# Patient Record
Sex: FEMALE | Race: WHITE | NOT HISPANIC OR LATINO | Employment: FULL TIME | URBAN - METROPOLITAN AREA
[De-identification: names, ages, dates, MRNs, and addresses within clinical notes are randomized per-mention and may not be internally consistent; named-entity substitution may affect disease eponyms.]

---

## 2023-09-28 LAB
ALANINE AMINOTRANSFERASE (SGPT) (U/L) IN SER/PLAS: 18 U/L (ref 7–45)
ALBUMIN (G/DL) IN SER/PLAS: 4.2 G/DL (ref 3.4–5)
ALKALINE PHOSPHATASE (U/L) IN SER/PLAS: 64 U/L (ref 33–136)
ANION GAP IN SER/PLAS: 16 MMOL/L (ref 10–20)
ASPARTATE AMINOTRANSFERASE (SGOT) (U/L) IN SER/PLAS: 17 U/L (ref 9–39)
BASOPHILS (10*3/UL) IN BLOOD BY AUTOMATED COUNT: 0.04 X10E9/L (ref 0–0.1)
BASOPHILS/100 LEUKOCYTES IN BLOOD BY AUTOMATED COUNT: 0.6 % (ref 0–2)
BILIRUBIN TOTAL (MG/DL) IN SER/PLAS: 0.5 MG/DL (ref 0–1.2)
CALCIUM (MG/DL) IN SER/PLAS: 9.9 MG/DL (ref 8.6–10.6)
CARBON DIOXIDE, TOTAL (MMOL/L) IN SER/PLAS: 24 MMOL/L (ref 21–32)
CHLORIDE (MMOL/L) IN SER/PLAS: 106 MMOL/L (ref 98–107)
CHOLESTEROL (MG/DL) IN SER/PLAS: 229 MG/DL (ref 0–199)
CHOLESTEROL IN HDL (MG/DL) IN SER/PLAS: 32 MG/DL
CHOLESTEROL/HDL RATIO: 7.2
COBALAMIN (VITAMIN B12) (PG/ML) IN SER/PLAS: 1322 PG/ML (ref 211–911)
CREATININE (MG/DL) IN SER/PLAS: 0.72 MG/DL (ref 0.5–1.05)
EOSINOPHILS (10*3/UL) IN BLOOD BY AUTOMATED COUNT: 0.36 X10E9/L (ref 0–0.7)
EOSINOPHILS/100 LEUKOCYTES IN BLOOD BY AUTOMATED COUNT: 5.1 % (ref 0–6)
ERYTHROCYTE DISTRIBUTION WIDTH (RATIO) BY AUTOMATED COUNT: 13 % (ref 11.5–14.5)
ERYTHROCYTE MEAN CORPUSCULAR HEMOGLOBIN CONCENTRATION (G/DL) BY AUTOMATED: 33.1 G/DL (ref 32–36)
ERYTHROCYTE MEAN CORPUSCULAR VOLUME (FL) BY AUTOMATED COUNT: 92 FL (ref 80–100)
ERYTHROCYTES (10*6/UL) IN BLOOD BY AUTOMATED COUNT: 4.75 X10E12/L (ref 4–5.2)
ESTIMATED AVERAGE GLUCOSE FOR HBA1C: 117 MG/DL
FOLATE (NG/ML) IN SER/PLAS: 23.8 NG/ML
GFR FEMALE: 90 ML/MIN/1.73M2
GLUCOSE (MG/DL) IN SER/PLAS: 116 MG/DL (ref 74–99)
HEMATOCRIT (%) IN BLOOD BY AUTOMATED COUNT: 43.5 % (ref 36–46)
HEMOGLOBIN (G/DL) IN BLOOD: 14.4 G/DL (ref 12–16)
HEMOGLOBIN A1C/HEMOGLOBIN TOTAL IN BLOOD: 5.7 %
IMMATURE GRANULOCYTES/100 LEUKOCYTES IN BLOOD BY AUTOMATED COUNT: 0.3 % (ref 0–0.9)
LDL: 135 MG/DL (ref 0–99)
LEUKOCYTES (10*3/UL) IN BLOOD BY AUTOMATED COUNT: 7.1 X10E9/L (ref 4.4–11.3)
LYMPHOCYTES (10*3/UL) IN BLOOD BY AUTOMATED COUNT: 1.61 X10E9/L (ref 1.2–4.8)
LYMPHOCYTES/100 LEUKOCYTES IN BLOOD BY AUTOMATED COUNT: 22.8 % (ref 13–44)
MONOCYTES (10*3/UL) IN BLOOD BY AUTOMATED COUNT: 0.44 X10E9/L (ref 0.1–1)
MONOCYTES/100 LEUKOCYTES IN BLOOD BY AUTOMATED COUNT: 6.2 % (ref 2–10)
NEUTROPHILS (10*3/UL) IN BLOOD BY AUTOMATED COUNT: 4.6 X10E9/L (ref 1.2–7.7)
NEUTROPHILS/100 LEUKOCYTES IN BLOOD BY AUTOMATED COUNT: 65 % (ref 40–80)
NON HDL CHOLESTEROL: 197 MG/DL
NRBC (PER 100 WBCS) BY AUTOMATED COUNT: 0 /100 WBC (ref 0–0)
PLATELETS (10*3/UL) IN BLOOD AUTOMATED COUNT: 222 X10E9/L (ref 150–450)
POTASSIUM (MMOL/L) IN SER/PLAS: 3.9 MMOL/L (ref 3.5–5.3)
PROTEIN TOTAL: 7 G/DL (ref 6.4–8.2)
SODIUM (MMOL/L) IN SER/PLAS: 142 MMOL/L (ref 136–145)
THYROTROPIN (MIU/L) IN SER/PLAS BY DETECTION LIMIT <= 0.05 MIU/L: 2.08 MIU/L (ref 0.44–3.98)
TRIGLYCERIDE (MG/DL) IN SER/PLAS: 309 MG/DL (ref 0–149)
UREA NITROGEN (MG/DL) IN SER/PLAS: 15 MG/DL (ref 6–23)
VLDL: 62 MG/DL (ref 0–40)

## 2023-10-02 LAB — METHYLMALONIC ACID, S: 0.19 UMOL/L (ref 0–0.4)

## 2023-11-01 PROBLEM — E78.2 MIXED HYPERLIPIDEMIA: Status: ACTIVE | Noted: 2023-11-01

## 2023-11-01 PROBLEM — R06.09 DYSPNEA ON EXERTION: Status: ACTIVE | Noted: 2023-11-01

## 2023-11-01 PROBLEM — R29.898 RIGHT HAND WEAKNESS: Status: ACTIVE | Noted: 2023-11-01

## 2023-11-01 PROBLEM — L82.1 SEBORRHEIC KERATOSES: Status: ACTIVE | Noted: 2022-11-17

## 2023-11-01 PROBLEM — L81.4 OTHER MELANIN HYPERPIGMENTATION: Status: ACTIVE | Noted: 2022-11-17

## 2023-11-01 PROBLEM — F45.8 BRUXISM: Status: ACTIVE | Noted: 2023-11-01

## 2023-11-01 PROBLEM — E66.2 CLASS 1 OBESITY WITH ALVEOLAR HYPOVENTILATION AND BODY MASS INDEX (BMI) OF 30.0 TO 30.9 IN ADULT (MULTI): Status: ACTIVE | Noted: 2023-11-01

## 2023-11-01 PROBLEM — L21.8 OTHER SEBORRHEIC DERMATITIS: Status: ACTIVE | Noted: 2022-11-17

## 2023-11-01 PROBLEM — R94.31 INVERTED T WAVE: Status: ACTIVE | Noted: 2023-11-01

## 2023-11-01 PROBLEM — G47.19 EXCESSIVE DAYTIME SLEEPINESS: Status: ACTIVE | Noted: 2023-11-01

## 2023-11-01 PROBLEM — R73.03 PREDIABETES: Status: ACTIVE | Noted: 2023-11-01

## 2023-11-01 PROBLEM — R42 ORTHOSTATIC DIZZINESS: Status: ACTIVE | Noted: 2023-11-01

## 2023-11-01 PROBLEM — J45.909 ASTHMA (HHS-HCC): Status: ACTIVE | Noted: 2023-11-01

## 2023-11-01 PROBLEM — N39.41 URGE INCONTINENCE: Status: ACTIVE | Noted: 2023-11-01

## 2023-11-01 PROBLEM — D18.01 HEMANGIOMA OF SKIN AND SUBCUTANEOUS TISSUE: Status: ACTIVE | Noted: 2022-11-17

## 2023-11-01 PROBLEM — R26.81 UNSTEADY GAIT: Status: ACTIVE | Noted: 2023-11-01

## 2023-11-01 PROBLEM — N32.81 OVERACTIVE BLADDER: Status: ACTIVE | Noted: 2023-11-01

## 2023-11-01 PROBLEM — L40.9 PSORIASIS: Status: ACTIVE | Noted: 2023-11-01

## 2023-11-01 PROBLEM — M41.9 SCOLIOSIS OF CERVICAL SPINE: Status: ACTIVE | Noted: 2023-11-01

## 2023-11-01 PROBLEM — E66.811 CLASS 1 OBESITY WITH ALVEOLAR HYPOVENTILATION AND BODY MASS INDEX (BMI) OF 30.0 TO 30.9 IN ADULT: Status: ACTIVE | Noted: 2023-11-01

## 2023-11-01 PROBLEM — R32 URINARY INCONTINENCE: Status: ACTIVE | Noted: 2023-11-01

## 2023-11-01 PROBLEM — Z78.0 MENOPAUSE: Status: ACTIVE | Noted: 2023-11-01

## 2023-11-01 PROBLEM — L57.8 OTHER SKIN CHANGES DUE TO CHRONIC EXPOSURE TO NONIONIZING RADIATION: Status: ACTIVE | Noted: 2022-11-17

## 2023-11-01 PROBLEM — D69.2 PURPURA (CMS-HCC): Status: ACTIVE | Noted: 2023-11-01

## 2023-11-01 PROBLEM — R41.3 MEMORY CHANGES: Status: ACTIVE | Noted: 2023-11-01

## 2023-11-01 PROBLEM — H53.9 VISION CHANGES: Status: ACTIVE | Noted: 2023-11-01

## 2023-11-01 PROBLEM — R20.2 PARESTHESIAS: Status: ACTIVE | Noted: 2023-11-01

## 2023-11-01 PROBLEM — G56.02 CARPAL TUNNEL SYNDROME OF LEFT WRIST: Status: ACTIVE | Noted: 2023-11-01

## 2023-11-01 PROBLEM — K59.09 CHRONIC CONSTIPATION: Status: ACTIVE | Noted: 2023-11-01

## 2023-11-01 PROBLEM — R51.9 CHRONIC DAILY HEADACHE: Status: ACTIVE | Noted: 2023-11-01

## 2023-11-01 RX ORDER — BIOTIN 10 MG
1 TABLET ORAL DAILY
COMMUNITY
Start: 2018-01-22

## 2023-11-01 RX ORDER — IBUPROFEN 200 MG
200 TABLET ORAL
COMMUNITY
Start: 2020-04-01

## 2023-11-01 RX ORDER — IBUPROFEN 100 MG/5ML
1 SUSPENSION, ORAL (FINAL DOSE FORM) ORAL DAILY
COMMUNITY
Start: 2016-08-23

## 2023-11-01 RX ORDER — CHOLECALCIFEROL (VITAMIN D3) 25 MCG
1 TABLET ORAL DAILY
COMMUNITY

## 2023-11-01 RX ORDER — PRAVASTATIN SODIUM 20 MG/1
1 TABLET ORAL NIGHTLY
COMMUNITY
Start: 2021-05-19 | End: 2023-11-27 | Stop reason: SDUPTHER

## 2023-11-01 RX ORDER — MULTIVITAMIN/IRON/FOLIC ACID 18MG-0.4MG
1 TABLET ORAL DAILY
COMMUNITY
Start: 2016-08-23

## 2023-11-01 RX ORDER — ACETAMINOPHEN 160 MG/5ML
1 SUSPENSION, ORAL (FINAL DOSE FORM) ORAL DAILY
COMMUNITY
Start: 2016-08-23

## 2023-11-01 RX ORDER — TRIAMCINOLONE ACETONIDE 1 MG/G
CREAM TOPICAL
COMMUNITY
Start: 2017-09-26 | End: 2023-11-27 | Stop reason: SDUPTHER

## 2023-11-01 RX ORDER — UBIDECARENONE/VIT E ACET 100MG-5
1 CAPSULE ORAL DAILY
COMMUNITY

## 2023-11-01 RX ORDER — SELENIUM SULFIDE 2.5 MG/100ML
LOTION TOPICAL
COMMUNITY
Start: 2017-09-26

## 2023-11-01 RX ORDER — MAGNESIUM 200 MG
TABLET ORAL
COMMUNITY

## 2023-11-01 RX ORDER — ALBUTEROL SULFATE 90 UG/1
2 AEROSOL, METERED RESPIRATORY (INHALATION) EVERY 4 HOURS PRN
COMMUNITY
Start: 2020-04-01 | End: 2023-11-27 | Stop reason: SDUPTHER

## 2023-11-01 RX ORDER — LANOLIN ALCOHOL/MO/W.PET/CERES
1 CREAM (GRAM) TOPICAL DAILY
COMMUNITY
Start: 2016-08-23

## 2023-11-02 ENCOUNTER — LAB (OUTPATIENT)
Dept: LAB | Facility: LAB | Age: 68
End: 2023-11-02
Payer: COMMERCIAL

## 2023-11-02 ENCOUNTER — OFFICE VISIT (OUTPATIENT)
Dept: PRIMARY CARE | Facility: CLINIC | Age: 68
End: 2023-11-02
Payer: COMMERCIAL

## 2023-11-02 ENCOUNTER — APPOINTMENT (OUTPATIENT)
Dept: LAB | Facility: LAB | Age: 68
End: 2023-11-02
Payer: COMMERCIAL

## 2023-11-02 VITALS
HEIGHT: 62 IN | OXYGEN SATURATION: 97 % | DIASTOLIC BLOOD PRESSURE: 92 MMHG | BODY MASS INDEX: 33.21 KG/M2 | WEIGHT: 180.5 LBS | SYSTOLIC BLOOD PRESSURE: 133 MMHG | TEMPERATURE: 97 F | HEART RATE: 66 BPM

## 2023-11-02 DIAGNOSIS — R09.89 CHRONIC THROAT CLEARING: Primary | ICD-10-CM

## 2023-11-02 DIAGNOSIS — H93.8X3 CONGESTION OF BOTH EARS: ICD-10-CM

## 2023-11-02 DIAGNOSIS — R51.9 CHRONIC DAILY HEADACHE: ICD-10-CM

## 2023-11-02 DIAGNOSIS — S93.402D SPRAIN OF LEFT ANKLE, UNSPECIFIED LIGAMENT, SUBSEQUENT ENCOUNTER: ICD-10-CM

## 2023-11-02 LAB
ANION GAP SERPL CALC-SCNC: 9 MMOL/L (ref 10–20)
BUN SERPL-MCNC: 16 MG/DL (ref 6–23)
CALCIUM SERPL-MCNC: 9.9 MG/DL (ref 8.6–10.6)
CHLORIDE SERPL-SCNC: 107 MMOL/L (ref 98–107)
CO2 SERPL-SCNC: 28 MMOL/L (ref 21–32)
CREAT SERPL-MCNC: 0.74 MG/DL (ref 0.5–1.05)
GFR SERPL CREATININE-BSD FRML MDRD: 88 ML/MIN/1.73M*2
GLUCOSE SERPL-MCNC: 119 MG/DL (ref 74–99)
POTASSIUM SERPL-SCNC: 4.2 MMOL/L (ref 3.5–5.3)
SODIUM SERPL-SCNC: 140 MMOL/L (ref 136–145)

## 2023-11-02 PROCEDURE — 80048 BASIC METABOLIC PNL TOTAL CA: CPT

## 2023-11-02 PROCEDURE — 1159F MED LIST DOCD IN RCRD: CPT | Performed by: INTERNAL MEDICINE

## 2023-11-02 PROCEDURE — 36415 COLL VENOUS BLD VENIPUNCTURE: CPT

## 2023-11-02 PROCEDURE — 99214 OFFICE O/P EST MOD 30 MIN: CPT | Performed by: INTERNAL MEDICINE

## 2023-11-02 PROCEDURE — 1160F RVW MEDS BY RX/DR IN RCRD: CPT | Performed by: INTERNAL MEDICINE

## 2023-11-02 PROCEDURE — 1036F TOBACCO NON-USER: CPT | Performed by: INTERNAL MEDICINE

## 2023-11-02 RX ORDER — OMEPRAZOLE 40 MG/1
40 CAPSULE, DELAYED RELEASE ORAL
Qty: 30 CAPSULE | Refills: 2 | Status: SHIPPED | OUTPATIENT
Start: 2023-11-02 | End: 2024-01-31

## 2023-11-02 ASSESSMENT — ENCOUNTER SYMPTOMS
LIGHT-HEADEDNESS: 0
HEMATURIA: 0
CONSTIPATION: 0
PALPITATIONS: 0
ABDOMINAL PAIN: 0
ARTHRALGIAS: 1
VOMITING: 0
MYALGIAS: 1
DIARRHEA: 0
DYSURIA: 0
COUGH: 1
FATIGUE: 0
FEVER: 0
CHILLS: 0
HEADACHES: 0
DIZZINESS: 0
NAUSEA: 0

## 2023-11-02 ASSESSMENT — PATIENT HEALTH QUESTIONNAIRE - PHQ9
1. LITTLE INTEREST OR PLEASURE IN DOING THINGS: NOT AT ALL
2. FEELING DOWN, DEPRESSED OR HOPELESS: NOT AT ALL
SUM OF ALL RESPONSES TO PHQ9 QUESTIONS 1 AND 2: 0

## 2023-11-02 NOTE — PROGRESS NOTES
"Subjective   Patient ID: Shania Quintero is a 68 y.o. female who presents for Asthma.  Multiple complaints.    HPI     States she is losing health insurance at end of month.    Bilateral breast pain - has mammogram and ultrasound scheduled for tomorrow.    Dr. Moreno has ordered brain imaging - she is scheduled 11/16.  Told she needs labs prior to MRI.    States chest is always congested.  She has been coughing a lot.  Chronic throat clearing.  She needs referral to new ENT.    Left ankle sprain - occurred in August.  Still feels weak and sore at times.    Skin blemish RLE - wants it checked.    Review of Systems   Constitutional:  Negative for chills, fatigue and fever.   HENT:          Chronic throat congestion and throat clearing  Chronic ear fullness / congestion   Respiratory:  Positive for cough.    Cardiovascular:  Negative for chest pain, palpitations and leg swelling.   Gastrointestinal:  Negative for abdominal pain, constipation, diarrhea, nausea and vomiting.   Genitourinary:  Negative for dysuria and hematuria.   Musculoskeletal:  Positive for arthralgias and myalgias.   Neurological:  Negative for dizziness, light-headedness and headaches.       Objective   BP (!) 133/92   Pulse 66   Temp 36.1 °C (97 °F)   Ht 1.581 m (5' 2.25\")   Wt 81.9 kg (180 lb 8 oz)   SpO2 97%   BMI 32.75 kg/m²     Physical Exam  Constitutional:       Appearance: Normal appearance.   HENT:      Head: Normocephalic and atraumatic.   Cardiovascular:      Rate and Rhythm: Normal rate and regular rhythm.      Pulses: Normal pulses.   Pulmonary:      Effort: Pulmonary effort is normal. No respiratory distress.      Breath sounds: Normal breath sounds. No wheezing.   Abdominal:      General: There is no distension.      Palpations: Abdomen is soft.      Tenderness: There is no abdominal tenderness.   Musculoskeletal:      Right lower leg: No edema.      Left lower leg: No edema.      Comments: Minimal swelling of left lateral " ankle, no erythema, no specific point tenderness, no pain with passive rotation.  Small scab right anterior lower leg, no sign of infection.   Skin:     Findings: No rash.   Neurological:      General: No focal deficit present.      Mental Status: She is alert and oriented to person, place, and time. Mental status is at baseline.   Psychiatric:         Mood and Affect: Mood normal.         Behavior: Behavior normal.         Thought Content: Thought content normal.         Judgment: Judgment normal.         Assessment/Plan   Problem List Items Addressed This Visit             ICD-10-CM    Chronic daily headache R51.9    Relevant Orders    Basic metabolic panel     Other Visit Diagnoses         Codes    Chronic throat clearing    -  Primary R09.89    Relevant Medications    omeprazole (PriLOSEC) 40 mg DR capsule    Other Relevant Orders    Referral to ENT    Congestion of both ears     H93.8X3    Relevant Orders    Referral to ENT    Sprain of left ankle, unspecified ligament, subsequent encounter     S93.402D          Chronic throat clearing - lungs are clear today, recent CXR shows no acute findings.  I recommend trial of PPI and working on anti-reflux measures, could be related to LPR.  Referral to ENT.    Chronic ear congestion - prior ENT prescribed her nasal steroid spray, which she was not interested in taking due to potential side effects.  Referral to ENT.    BMP ordered to assess BUN and Cr prior to MRI with contrast.    Left ankle sprain - occurred in August, still having some discomfort and weakness.  I sent her instructions on ankle strengthening exercises through [employer].    RLE blemish - encouraged her to leave the area alone, should heal.    Follow-up in 3 months and PRN.

## 2023-11-22 ENCOUNTER — TELEPHONE (OUTPATIENT)
Dept: NEUROLOGY | Facility: CLINIC | Age: 68
End: 2023-11-22

## 2023-11-22 NOTE — TELEPHONE ENCOUNTER
Patient's insurance is denying requests for MRI brain and MRA head - if you would like to do peer to peer please call [phone]

## 2023-11-27 ENCOUNTER — TELEPHONE (OUTPATIENT)
Dept: PRIMARY CARE | Facility: CLINIC | Age: 68
End: 2023-11-27
Payer: COMMERCIAL

## 2023-11-27 DIAGNOSIS — E78.2 MIXED HYPERLIPIDEMIA: Primary | ICD-10-CM

## 2023-11-27 DIAGNOSIS — L40.9 PSORIASIS: ICD-10-CM

## 2023-11-27 DIAGNOSIS — J45.909 ASTHMA, UNSPECIFIED ASTHMA SEVERITY, UNSPECIFIED WHETHER COMPLICATED, UNSPECIFIED WHETHER PERSISTENT (HHS-HCC): ICD-10-CM

## 2023-11-27 RX ORDER — TRIAMCINOLONE ACETONIDE 1 MG/G
CREAM TOPICAL
Qty: 453 G | Refills: 1 | Status: SHIPPED | OUTPATIENT
Start: 2023-11-27

## 2023-11-27 RX ORDER — ALBUTEROL SULFATE 90 UG/1
2 AEROSOL, METERED RESPIRATORY (INHALATION) EVERY 4 HOURS PRN
Qty: 18 G | Refills: 11 | Status: SHIPPED | OUTPATIENT
Start: 2023-11-27 | End: 2024-11-26

## 2023-11-27 RX ORDER — PRAVASTATIN SODIUM 20 MG/1
20 TABLET ORAL NIGHTLY
Qty: 90 TABLET | Refills: 3 | Status: SHIPPED | OUTPATIENT
Start: 2023-11-27 | End: 2024-11-26

## 2023-12-22 ENCOUNTER — TELEPHONE (OUTPATIENT)
Dept: RHEUMATOLOGY | Facility: CLINIC | Age: 68
End: 2023-12-22

## 2023-12-22 DIAGNOSIS — U07.1 COVID: Primary | ICD-10-CM

## 2023-12-22 RX ORDER — BENZONATATE 200 MG/1
200 CAPSULE ORAL 3 TIMES DAILY PRN
Qty: 30 CAPSULE | Refills: 0 | Status: SHIPPED | OUTPATIENT
Start: 2023-12-22 | End: 2024-01-01

## 2023-12-22 RX ORDER — BENZONATATE 200 MG/1
200 CAPSULE ORAL 3 TIMES DAILY PRN
Qty: 30 CAPSULE | Refills: 0 | Status: SHIPPED | OUTPATIENT
Start: 2023-12-22 | End: 2023-12-22 | Stop reason: SDUPTHER

## 2023-12-22 NOTE — TELEPHONE ENCOUNTER
Tested postive on 19th for covid which is the 2nd time she tested. The first time was more red and positive and very faint. She is having a worse cough. No Shortness of breath, increase in fatigue than normal. She feels like she may have bronchitis or something. She is asking for a z-pack. Please advise. Patient of Dr. Pretty.     Pharmacy: [facility]

## 2024-02-09 DIAGNOSIS — R41.3 MEMORY CHANGES: ICD-10-CM

## 2024-02-09 DIAGNOSIS — R51.9 CHRONIC DAILY HEADACHE: ICD-10-CM

## 2024-03-14 ENCOUNTER — TELEPHONE (OUTPATIENT)
Dept: PRIMARY CARE | Facility: CLINIC | Age: 69
End: 2024-03-14
Payer: MEDICARE

## 2024-03-15 ENCOUNTER — LAB (OUTPATIENT)
Dept: LAB | Facility: LAB | Age: 69
End: 2024-03-15
Payer: MEDICARE

## 2024-03-15 DIAGNOSIS — R41.3 MEMORY CHANGES: ICD-10-CM

## 2024-03-15 DIAGNOSIS — R51.9 CHRONIC DAILY HEADACHE: ICD-10-CM

## 2024-03-15 LAB
BUN SERPL-MCNC: 17 MG/DL (ref 6–23)
CREAT SERPL-MCNC: 0.8 MG/DL (ref 0.5–1.05)
EGFRCR SERPLBLD CKD-EPI 2021: 80 ML/MIN/1.73M*2

## 2024-03-15 PROCEDURE — 84520 ASSAY OF UREA NITROGEN: CPT

## 2024-03-15 PROCEDURE — 36415 COLL VENOUS BLD VENIPUNCTURE: CPT

## 2024-03-15 PROCEDURE — 82565 ASSAY OF CREATININE: CPT

## 2024-03-21 ENCOUNTER — HOSPITAL ENCOUNTER (OUTPATIENT)
Dept: RADIOLOGY | Facility: CLINIC | Age: 69
Discharge: HOME | End: 2024-03-21
Payer: MEDICARE

## 2024-03-21 DIAGNOSIS — R51.9 CHRONIC DAILY HEADACHE: ICD-10-CM

## 2024-03-21 DIAGNOSIS — R41.3 MEMORY CHANGES: ICD-10-CM

## 2024-03-21 PROCEDURE — 2550000001 HC RX 255 CONTRASTS: Performed by: PSYCHIATRY & NEUROLOGY

## 2024-03-21 PROCEDURE — 70498 CT ANGIOGRAPHY NECK: CPT | Performed by: RADIOLOGY

## 2024-03-21 PROCEDURE — 70498 CT ANGIOGRAPHY NECK: CPT

## 2024-03-21 PROCEDURE — 70496 CT ANGIOGRAPHY HEAD: CPT | Performed by: RADIOLOGY

## 2024-03-21 RX ADMIN — IOHEXOL 90 ML: 350 INJECTION, SOLUTION INTRAVENOUS at 10:31

## 2024-04-04 ENCOUNTER — OFFICE VISIT (OUTPATIENT)
Dept: PRIMARY CARE | Facility: CLINIC | Age: 69
End: 2024-04-04
Payer: MEDICARE

## 2024-04-04 VITALS
BODY MASS INDEX: 32.25 KG/M2 | TEMPERATURE: 97.2 F | SYSTOLIC BLOOD PRESSURE: 136 MMHG | WEIGHT: 182 LBS | OXYGEN SATURATION: 97 % | DIASTOLIC BLOOD PRESSURE: 74 MMHG | HEART RATE: 74 BPM | HEIGHT: 63 IN

## 2024-04-04 DIAGNOSIS — J34.89 SINUS DRAINAGE: ICD-10-CM

## 2024-04-04 DIAGNOSIS — R09.89 CHRONIC THROAT CLEARING: ICD-10-CM

## 2024-04-04 DIAGNOSIS — E04.1 THYROID NODULE: Primary | ICD-10-CM

## 2024-04-04 DIAGNOSIS — R42 VERTIGO: ICD-10-CM

## 2024-04-04 PROCEDURE — 1126F AMNT PAIN NOTED NONE PRSNT: CPT | Performed by: INTERNAL MEDICINE

## 2024-04-04 PROCEDURE — 99214 OFFICE O/P EST MOD 30 MIN: CPT | Performed by: INTERNAL MEDICINE

## 2024-04-04 PROCEDURE — 1160F RVW MEDS BY RX/DR IN RCRD: CPT | Performed by: INTERNAL MEDICINE

## 2024-04-04 PROCEDURE — 1159F MED LIST DOCD IN RCRD: CPT | Performed by: INTERNAL MEDICINE

## 2024-04-04 PROCEDURE — 1036F TOBACCO NON-USER: CPT | Performed by: INTERNAL MEDICINE

## 2024-04-04 ASSESSMENT — PAIN SCALES - GENERAL: PAINLEVEL: 0-NO PAIN

## 2024-04-04 ASSESSMENT — ENCOUNTER SYMPTOMS
SINUS PRESSURE: 1
LIGHT-HEADEDNESS: 0
DYSURIA: 0
NAUSEA: 0
DIZZINESS: 1
HEADACHES: 0
CHILLS: 0
CONFUSION: 0
VOMITING: 0
ABDOMINAL PAIN: 0
DIARRHEA: 0
FEVER: 0
SHORTNESS OF BREATH: 1
FATIGUE: 0
HEMATURIA: 0
PALPITATIONS: 0
COUGH: 1
CONSTIPATION: 0
TROUBLE SWALLOWING: 1

## 2024-04-04 ASSESSMENT — PATIENT HEALTH QUESTIONNAIRE - PHQ9
2. FEELING DOWN, DEPRESSED OR HOPELESS: NOT AT ALL
SUM OF ALL RESPONSES TO PHQ9 QUESTIONS 1 AND 2: 0
1. LITTLE INTEREST OR PLEASURE IN DOING THINGS: NOT AT ALL

## 2024-04-04 NOTE — PROGRESS NOTES
CHIEF COMPLAINT  Follow-up    HISTORY OF PRESENT ILLNESS  Shania Quintero is a 68 y.o. female presents today for follow up of Follow-up    HPI    Last visit with me Nov 2023.    Has seen Dr. Moreno - he ordered CT Head & Neck.  This showed a thyroid nodule, was advised to follow-up with PCP for thyroid ultrasound.    Has several sinus symptoms including increased mucus.  Feels it in her sinuses, down her throat.  Feels like she tastes it all the time.   Has a hard time swallowing, which she believes is due to the mucus in her throat.  Denies regurgitation.     Recurrent vertigo.  Triggered when she gets up out of bed in AM.  Also when she lays down in bed.    Chronic dyspnea - had PFT's and echocardiogram in February, both are OK.  CXR Oct 2023 was clear.    REVIEW OF SYSTEMS  Review of Systems   Constitutional:  Negative for chills, fatigue and fever.   HENT:  Positive for congestion, dental problem, postnasal drip, sinus pressure and trouble swallowing.    Respiratory:  Positive for cough and shortness of breath.    Cardiovascular:  Negative for chest pain, palpitations and leg swelling.   Gastrointestinal:  Negative for abdominal pain, constipation, diarrhea, nausea and vomiting.   Genitourinary:  Negative for dysuria and hematuria.   Skin:  Negative for rash.   Neurological:  Positive for dizziness. Negative for light-headedness and headaches.   Psychiatric/Behavioral:  Negative for confusion.        ALLERGIES  Niacin and Nystatin    MEDICATIONS  Current Outpatient Medications   Medication Instructions    albuterol 90 mcg/actuation inhaler 2 puffs, inhalation, Every 4 hours PRN    ascorbic acid (Vitamin C) 1,000 mg tablet 1 tablet, oral, Daily    biotin 10 mg tablet 1 tablet, oral, Daily    cholecalciferol (Vitamin D-3) 25 MCG (1000 UT) tablet 1 tablet, oral, Daily    CINNAMON BARK ORAL oral    coenzyme Q-10 200 mg capsule 1 capsule, oral, Daily    cyanocobalamin (Vitamin B-12) 1,000 mcg tablet 1 tablet, oral,  "Daily    ibuprofen (ADVIL) 200 mg, oral    L. acidophilus/Bifid. animalis 32 billion cell capsule 1 capsule, oral, Daily    loratadine 10 mg capsule 1 capsule, oral, Daily    magnesium 200 mg tablet oral    multivitamin with minerals (Centrum) tablet 1 tablet, oral, Daily    omeprazole (PRILOSEC) 40 mg, oral, Daily before breakfast, Do not crush or chew.    pravastatin (PRAVACHOL) 20 mg, oral, Nightly    resveratroL 100 mg capsule 1 tablet, oral, Daily    selenium sulfide (Selsun) 2.5 % shampoo Topical    triamcinolone (Kenalog) 0.1 % cream Apply to affected area twice daily as needed.       TOBACCO USE  Social History     Tobacco Use   Smoking Status Never   Smokeless Tobacco Never       DEPRESSION SCREEN  Over the past 2 weeks, how often have you been bothered by any of the following problems?  Little interest or pleasure in doing things: Not at all  Feeling down, depressed, or hopeless: Not at all    SURGICAL HISTORY  Past Surgical History:  2016: BREAST SURGERY      Comment:  Breast Surgery Reduction Procedure  2016:  SECTION, CLASSIC      Comment:   Section  2017: COLONOSCOPY      Comment:  Complete Colonoscopy  2020: OTHER SURGICAL HISTORY      Comment:  Endoscopy  2022: OTHER SURGICAL HISTORY      Comment:  Complete colonoscopy  2016: OTHER SURGICAL HISTORY      Comment:  Cervical Cerclage During Pregnancy       OBJECTIVE    /74   Pulse 74   Temp 36.2 °C (97.2 °F) (Temporal)   Ht 1.588 m (5' 2.5\")   Wt 82.6 kg (182 lb)   SpO2 97%   BMI 32.76 kg/m²    BMI: Estimated body mass index is 32.76 kg/m² as calculated from the following:    Height as of this encounter: 1.588 m (5' 2.5\").    Weight as of this encounter: 82.6 kg (182 lb).    BP Readings from Last 3 Encounters:   24 136/74   23 (!) 133/92   23 138/84      Wt Readings from Last 3 Encounters:   24 82.6 kg (182 lb)   23 81.9 kg (180 lb 8 oz)   23 78 kg (172 " lb)        PHYSICAL EXAM  Physical Exam  Constitutional:       Appearance: Normal appearance.   HENT:      Head: Normocephalic and atraumatic.      Right Ear: Tympanic membrane normal.      Left Ear: Tympanic membrane and ear canal normal.      Ears:      Comments: 2 papules right posterior ear canal   Eyes:      Extraocular Movements: Extraocular movements intact.      Pupils: Pupils are equal, round, and reactive to light.   Cardiovascular:      Rate and Rhythm: Normal rate and regular rhythm.      Pulses: Normal pulses.   Pulmonary:      Effort: Pulmonary effort is normal. No respiratory distress.      Breath sounds: Normal breath sounds. No wheezing.   Abdominal:      General: There is no distension.      Palpations: Abdomen is soft.      Tenderness: There is no abdominal tenderness.   Musculoskeletal:      Right lower leg: No edema.      Left lower leg: No edema.   Skin:     Findings: No rash.   Neurological:      General: No focal deficit present.      Mental Status: She is alert and oriented to person, place, and time. Mental status is at baseline.   Psychiatric:         Mood and Affect: Mood normal.         Behavior: Behavior normal.         Thought Content: Thought content normal.         Judgment: Judgment normal.          ASSESSMENT AND PLAN  Assessment/Plan   Problem List Items Addressed This Visit       Thyroid nodule - Primary    Relevant Orders    US thyroid    Vertigo    Relevant Orders    Referral to Physical Therapy     Other Visit Diagnoses       Chronic throat clearing        Relevant Orders    Referral to ENT    Sinus drainage        Relevant Orders    Referral to ENT            Chronic sinus congestion with drainage, sometimes has trouble swallowing, also 2 papules in right ear canal - referral to ENT.    Vertigo, positional, likely BPPV - referral for vestibular therapy.    Thyroid nodule on CT scan - US ordered.     Chronic dyspnea - since October, has had CXR, PFT's and echocardiogram which  did not reveal cause.  Referred to pulmonologist in 2023, patient did not schedule.  She is going to focus on her sinuses at the moment and we can readdress her dyspnea at her next visit.        Follow-up for Medicare Wellness ~ Aug 2024.

## 2024-04-23 ENCOUNTER — HOSPITAL ENCOUNTER (OUTPATIENT)
Dept: RADIOLOGY | Facility: CLINIC | Age: 69
Discharge: HOME | End: 2024-04-23
Payer: MEDICARE

## 2024-04-23 DIAGNOSIS — E04.1 THYROID NODULE: ICD-10-CM

## 2024-04-23 PROCEDURE — 76536 US EXAM OF HEAD AND NECK: CPT

## 2024-04-23 PROCEDURE — 76536 US EXAM OF HEAD AND NECK: CPT | Performed by: RADIOLOGY

## 2024-04-26 ENCOUNTER — TELEPHONE (OUTPATIENT)
Dept: PRIMARY CARE | Facility: CLINIC | Age: 69
End: 2024-04-26
Payer: MEDICARE

## 2024-04-26 DIAGNOSIS — E04.1 THYROID NODULE: Primary | ICD-10-CM

## 2024-04-26 NOTE — TELEPHONE ENCOUNTER
----- Message from Tasia Pretty MD sent at 4/26/2024  6:34 AM EDT -----  Please advise that the thyroid ultrasound confirms a nodule.  Given the characteristics of the nodule, I recommend that she see ENT, may need a needle biopsy.  The referral is in the chart.

## 2024-04-26 NOTE — TELEPHONE ENCOUNTER
Left detailed message on pts voicemail-Pt requested to leave message.  Advised her Dr. Geraldo Hendrickson.

## 2024-05-23 ENCOUNTER — APPOINTMENT (OUTPATIENT)
Dept: OTOLARYNGOLOGY | Facility: CLINIC | Age: 69
End: 2024-05-23
Payer: MEDICARE

## 2024-06-06 ENCOUNTER — APPOINTMENT (OUTPATIENT)
Dept: NEUROLOGY | Facility: CLINIC | Age: 69
End: 2024-06-06
Payer: MEDICARE

## 2024-08-12 ENCOUNTER — APPOINTMENT (OUTPATIENT)
Dept: NEUROLOGY | Facility: CLINIC | Age: 69
End: 2024-08-12
Payer: MEDICARE

## 2024-08-12 VITALS
HEART RATE: 83 BPM | BODY MASS INDEX: 32.07 KG/M2 | WEIGHT: 181 LBS | RESPIRATION RATE: 16 BRPM | TEMPERATURE: 97.2 F | HEIGHT: 63 IN | SYSTOLIC BLOOD PRESSURE: 126 MMHG | DIASTOLIC BLOOD PRESSURE: 80 MMHG

## 2024-08-12 DIAGNOSIS — R20.2 PARESTHESIAS: ICD-10-CM

## 2024-08-12 DIAGNOSIS — R42 ORTHOSTATIC DIZZINESS: ICD-10-CM

## 2024-08-12 DIAGNOSIS — R47.89 WORD FINDING DIFFICULTY: ICD-10-CM

## 2024-08-12 DIAGNOSIS — R51.9 CHRONIC DAILY HEADACHE: ICD-10-CM

## 2024-08-12 DIAGNOSIS — G56.02 CARPAL TUNNEL SYNDROME OF LEFT WRIST: ICD-10-CM

## 2024-08-12 DIAGNOSIS — R41.3 MEMORY CHANGES: Primary | Chronic | ICD-10-CM

## 2024-08-12 DIAGNOSIS — E04.1 THYROID NODULE: ICD-10-CM

## 2024-08-12 DIAGNOSIS — H53.9 VISION CHANGES: ICD-10-CM

## 2024-08-12 DIAGNOSIS — R29.898 RIGHT HAND WEAKNESS: ICD-10-CM

## 2024-08-12 DIAGNOSIS — F45.8 BRUXISM: ICD-10-CM

## 2024-08-12 DIAGNOSIS — G47.19 EXCESSIVE DAYTIME SLEEPINESS: ICD-10-CM

## 2024-08-12 PROCEDURE — 99215 OFFICE O/P EST HI 40 MIN: CPT | Performed by: PSYCHIATRY & NEUROLOGY

## 2024-08-12 PROCEDURE — G2211 COMPLEX E/M VISIT ADD ON: HCPCS | Performed by: PSYCHIATRY & NEUROLOGY

## 2024-08-12 PROCEDURE — 1160F RVW MEDS BY RX/DR IN RCRD: CPT | Performed by: PSYCHIATRY & NEUROLOGY

## 2024-08-12 PROCEDURE — 3008F BODY MASS INDEX DOCD: CPT | Performed by: PSYCHIATRY & NEUROLOGY

## 2024-08-12 PROCEDURE — 1036F TOBACCO NON-USER: CPT | Performed by: PSYCHIATRY & NEUROLOGY

## 2024-08-12 PROCEDURE — 1159F MED LIST DOCD IN RCRD: CPT | Performed by: PSYCHIATRY & NEUROLOGY

## 2024-08-12 RX ORDER — VITAMIN B COMPLEX
1 CAPSULE ORAL DAILY
COMMUNITY

## 2024-08-12 ASSESSMENT — ENCOUNTER SYMPTOMS
WEAKNESS: 1
SPEECH DIFFICULTY: 1
OCCASIONAL FEELINGS OF UNSTEADINESS: 1
DIZZINESS: 1
LOSS OF SENSATION IN FEET: 0
DEPRESSION: 0

## 2024-08-12 NOTE — PATIENT INSTRUCTIONS
The patient is complaining of worsened word finding problems and memory loss.  The patient needs an MRI of the brain without, EEG and neuropsych testing.  The patient can use symptomatic pain medicines for severe headaches and neck pain.  The patient can certainly use Motrin or Tylenol.  The patient should continue her medicines and stroke risk factor modification.  The patient does need to get a mouthguard to treat her bruxism.  I would like to have a copy of the patient's sleep study when it is done.  I discussed all these issues in detail with the patient and answered all of her questions.  The patient will follow up with me in 1 year.
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
## 2024-08-29 ENCOUNTER — APPOINTMENT (OUTPATIENT)
Dept: PRIMARY CARE | Facility: CLINIC | Age: 69
End: 2024-08-29
Payer: MEDICARE

## 2024-08-29 VITALS
DIASTOLIC BLOOD PRESSURE: 95 MMHG | BODY MASS INDEX: 31.91 KG/M2 | TEMPERATURE: 97.3 F | WEIGHT: 180.1 LBS | OXYGEN SATURATION: 98 % | HEART RATE: 71 BPM | SYSTOLIC BLOOD PRESSURE: 130 MMHG | HEIGHT: 63 IN

## 2024-08-29 DIAGNOSIS — R73.03 PREDIABETES: ICD-10-CM

## 2024-08-29 DIAGNOSIS — E78.2 MIXED HYPERLIPIDEMIA: ICD-10-CM

## 2024-08-29 DIAGNOSIS — R06.09 DYSPNEA ON EXERTION: ICD-10-CM

## 2024-08-29 DIAGNOSIS — E55.9 VITAMIN D DEFICIENCY: ICD-10-CM

## 2024-08-29 DIAGNOSIS — F43.29 STRESS AND ADJUSTMENT REACTION: ICD-10-CM

## 2024-08-29 DIAGNOSIS — R41.3 MEMORY CHANGES: ICD-10-CM

## 2024-08-29 DIAGNOSIS — Z12.31 VISIT FOR SCREENING MAMMOGRAM: ICD-10-CM

## 2024-08-29 DIAGNOSIS — Z00.00 MEDICARE ANNUAL WELLNESS VISIT, SUBSEQUENT: Primary | ICD-10-CM

## 2024-08-29 PROBLEM — Z71.89 CARDIAC RISK COUNSELING: Status: ACTIVE | Noted: 2024-08-29

## 2024-08-29 PROBLEM — Z13.89 SCREENING FOR MULTIPLE CONDITIONS: Status: ACTIVE | Noted: 2024-08-29

## 2024-08-29 PROCEDURE — 1160F RVW MEDS BY RX/DR IN RCRD: CPT | Performed by: INTERNAL MEDICINE

## 2024-08-29 PROCEDURE — 1159F MED LIST DOCD IN RCRD: CPT | Performed by: INTERNAL MEDICINE

## 2024-08-29 PROCEDURE — 99214 OFFICE O/P EST MOD 30 MIN: CPT | Performed by: INTERNAL MEDICINE

## 2024-08-29 PROCEDURE — 1036F TOBACCO NON-USER: CPT | Performed by: INTERNAL MEDICINE

## 2024-08-29 PROCEDURE — 3008F BODY MASS INDEX DOCD: CPT | Performed by: INTERNAL MEDICINE

## 2024-08-29 PROCEDURE — 1170F FXNL STATUS ASSESSED: CPT | Performed by: INTERNAL MEDICINE

## 2024-08-29 PROCEDURE — 1124F ACP DISCUSS-NO DSCNMKR DOCD: CPT | Performed by: INTERNAL MEDICINE

## 2024-08-29 PROCEDURE — G0439 PPPS, SUBSEQ VISIT: HCPCS | Performed by: INTERNAL MEDICINE

## 2024-08-29 ASSESSMENT — ENCOUNTER SYMPTOMS
ARTHRALGIAS: 1
PALPITATIONS: 0
FEVER: 0
CHILLS: 0
SHORTNESS OF BREATH: 1
DIZZINESS: 1
NECK STIFFNESS: 1
ROS GI COMMENTS: TROUBLE SWALLOWING

## 2024-08-29 ASSESSMENT — PATIENT HEALTH QUESTIONNAIRE - PHQ9
SUM OF ALL RESPONSES TO PHQ9 QUESTIONS 1 AND 2: 0
1. LITTLE INTEREST OR PLEASURE IN DOING THINGS: NOT AT ALL
2. FEELING DOWN, DEPRESSED OR HOPELESS: NOT AT ALL

## 2024-08-29 ASSESSMENT — ACTIVITIES OF DAILY LIVING (ADL)
DRESSING: INDEPENDENT
DOING_HOUSEWORK: INDEPENDENT
TAKING_MEDICATION: INDEPENDENT
GROCERY_SHOPPING: INDEPENDENT
MANAGING_FINANCES: INDEPENDENT
BATHING: INDEPENDENT

## 2024-08-29 NOTE — PROGRESS NOTES
CHIEF COMPLAINT  Medicare Annual Wellness Visit Subsequent    HISTORY OF PRESENT ILLNESS  Shania Quintero is a 68 y.o. female presents today for follow up of Medicare Annual Wellness Visit Subsequent    HPI    Past Medical, Surgical, and Family History reviewed and updated in chart.  Reviewed all medications by prescribing practitioner or clinical pharmacist (such as prescriptions, OTCs, herbal therapies and supplements) and documented in the medical record.    Frequent neck cracking.  Occurs several times per day.  Poor balance.  Heavy breathing - CXR, echo, PFT's in the last few years, but has not see pulmonary yet.     Vertigo is improving.    Saw ENT at [facility] for thyroid nodule.  -Appt 9/12 for thyroid biopsy.    Food is getting stuck in throat.  -Scheduled for esophagram in October.    Saw neurologist earlier in August.  - has order for memory testing and EEG  - MRI Brain ordered    Dr. Roper for visual disturbance.  Diagnosed with ocular migraine.    Stress level is high.     REVIEW OF SYSTEMS  Review of Systems   Constitutional:  Negative for chills and fever.   Eyes:  Positive for visual disturbance.   Respiratory:  Positive for shortness of breath.    Cardiovascular:  Negative for chest pain, palpitations and leg swelling.   Gastrointestinal:         Trouble swallowing   Musculoskeletal:  Positive for arthralgias and neck stiffness.   Neurological:  Positive for dizziness.        Poor balance       ALLERGIES  Niacin    MEDICATIONS  Current Outpatient Medications   Medication Instructions    albuterol 90 mcg/actuation inhaler 2 puffs, inhalation, Every 4 hours PRN    ascorbic acid (Vitamin C) 1,000 mg tablet 1 tablet, oral, Daily    b complex vitamins (Super B-50 Complex) capsule 1 capsule, oral, Daily    biotin 10 mg tablet 1 tablet, oral, Daily    cholecalciferol (Vitamin D-3) 25 MCG (1000 UT) tablet 1 tablet, oral, Daily    CINNAMON BARK ORAL oral    coenzyme Q-10 200 mg capsule 1 capsule, oral, Daily     "cyanocobalamin (Vitamin B-12) 1,000 mcg tablet 1 tablet, oral, Daily    ibuprofen (ADVIL) 200 mg, oral    L. acidophilus/Bifid. animalis 32 billion cell capsule 1 capsule, oral, Daily    multivitamin with minerals (Centrum) tablet 1 tablet, oral, Daily    pravastatin (PRAVACHOL) 20 mg, oral, Nightly    resveratroL 100 mg capsule 1 tablet, oral, Daily    triamcinolone (Kenalog) 0.1 % cream Apply to affected area twice daily as needed.       TOBACCO USE  Social History     Tobacco Use   Smoking Status Never    Passive exposure: Never   Smokeless Tobacco Never       DEPRESSION SCREEN  Over the past 2 weeks, how often have you been bothered by any of the following problems?  Little interest or pleasure in doing things: Not at all  Feeling down, depressed, or hopeless: Not at all    SURGICAL HISTORY  Past Surgical History:  2016: BREAST SURGERY      Comment:  Breast Surgery Reduction Procedure  2016:  SECTION, CLASSIC      Comment:   Section  2017: COLONOSCOPY      Comment:  Complete Colonoscopy  2020: OTHER SURGICAL HISTORY      Comment:  Endoscopy  2022: OTHER SURGICAL HISTORY      Comment:  Complete colonoscopy  2016: OTHER SURGICAL HISTORY      Comment:  Cervical Cerclage During Pregnancy       OBJECTIVE    BP (!) 130/95   Pulse 71   Temp 36.3 °C (97.3 °F)   Ht 1.588 m (5' 2.5\")   Wt 81.7 kg (180 lb 1.6 oz)   SpO2 98%   BMI 32.42 kg/m²    BMI: Estimated body mass index is 32.42 kg/m² as calculated from the following:    Height as of this encounter: 1.588 m (5' 2.5\").    Weight as of this encounter: 81.7 kg (180 lb 1.6 oz).    BP Readings from Last 3 Encounters:   24 (!) 130/95   24 126/80   24 136/74      Wt Readings from Last 3 Encounters:   24 81.7 kg (180 lb 1.6 oz)   24 82.1 kg (181 lb)   24 82.6 kg (182 lb)        PHYSICAL EXAM  Physical Exam  Constitutional:       Appearance: Normal appearance.   HENT:      Head: " Normocephalic and atraumatic.      Right Ear: Tympanic membrane normal.      Left Ear: Tympanic membrane normal.   Neck:      Vascular: No carotid bruit.   Cardiovascular:      Rate and Rhythm: Normal rate and regular rhythm.      Pulses: Normal pulses.      Heart sounds: No murmur heard.  Pulmonary:      Effort: Pulmonary effort is normal. No respiratory distress.      Breath sounds: Normal breath sounds. No wheezing.   Abdominal:      General: There is no distension.      Palpations: Abdomen is soft.      Tenderness: There is no abdominal tenderness.   Musculoskeletal:      Right lower leg: No edema.      Left lower leg: No edema.   Skin:     Findings: No rash.   Neurological:      General: No focal deficit present.      Mental Status: She is alert and oriented to person, place, and time. Mental status is at baseline.   Psychiatric:         Mood and Affect: Mood normal.         Behavior: Behavior normal.         Thought Content: Thought content normal.         Judgment: Judgment normal.          ASSESSMENT AND PLAN  Assessment/Plan   Problem List Items Addressed This Visit       Memory changes    Relevant Orders    Vitamin B12    Mixed hyperlipidemia    Relevant Orders    Lipid Panel    Comprehensive Metabolic Panel    CBC and Auto Differential    Prediabetes    Relevant Orders    Hemoglobin A1c    Dyspnea on exertion    Relevant Orders    Referral to Pulmonology    Medicare annual wellness visit, subsequent - Primary    Stress and adjustment reaction    Relevant Orders    Referral to [facility]    Visit for screening mammogram    Relevant Orders    BI mammo bilateral screening tomosynthesis    Vitamin D deficiency    Relevant Orders    Vitamin D 25-Hydroxy,Total (for eval of Vitamin D levels)     Medicare Wellness Visit completed.     Mixed hyperlipidemia - continue statin.      Dyspnea - chronic.  CXR 10/23/23, echocardiogram 2/19/24, PFT's 2/16/24.  Referral to pulmonologist.      Memory  loss, headaches - following with neurologist.    Thyroid nodule - managed by ENT, has biopsy scheduled.    Dysphagia - esophagram is scheduled.     Neck cracking is likely normal synovial fluid crepitus, patient reassured.     High stress - referral for counseling.      Check routine fasting labs.        Mammogram 1/12/21, 2/2/22.  Ordered.      Colonoscopy 5/24/22 - repeat 5 years.     Pap test 12/2021 normal.      DEXA 1/11/21 - normal.     Reviewed signs of skin cancer and importance of sun protection.      Continue to stay current with routine dental and eye exams.     Flu shot recommended annually in the Fall.    Follow-up in 3 months.

## 2024-09-10 ENCOUNTER — HOSPITAL ENCOUNTER (OUTPATIENT)
Dept: RADIOLOGY | Facility: CLINIC | Age: 69
Discharge: HOME | End: 2024-09-10
Payer: MEDICARE

## 2024-09-10 DIAGNOSIS — R41.3 MEMORY CHANGES: Chronic | ICD-10-CM

## 2024-09-10 PROCEDURE — 70551 MRI BRAIN STEM W/O DYE: CPT

## 2024-09-10 PROCEDURE — 70551 MRI BRAIN STEM W/O DYE: CPT | Performed by: RADIOLOGY

## 2024-09-18 ENCOUNTER — TELEPHONE (OUTPATIENT)
Dept: PRIMARY CARE | Facility: CLINIC | Age: 69
End: 2024-09-18
Payer: MEDICARE

## 2024-09-18 NOTE — TELEPHONE ENCOUNTER
Called pt and left message letting her know she can get all 4 vaccines done at the pharmacy now.   
Pt called about her vaccines. Her daughter is having a baby and she wants to know if she is due for her TDAP?   Also wants to know if she should get the RSV vaccine, and the flu and covid (she had them 11/23, she didn't know if she could get them now or had to wait)    Please advise.   
Detail Level: Generalized
Detail Level: Detailed

## 2024-09-25 ENCOUNTER — HOSPITAL ENCOUNTER (OUTPATIENT)
Dept: NEUROLOGY | Facility: HOSPITAL | Age: 69
Discharge: HOME | End: 2024-09-25
Payer: MEDICARE

## 2024-09-25 DIAGNOSIS — R41.3 MEMORY CHANGES: Chronic | ICD-10-CM

## 2024-09-25 PROCEDURE — 95816 EEG AWAKE AND DROWSY: CPT

## 2024-09-25 PROCEDURE — 95816 EEG AWAKE AND DROWSY: CPT | Performed by: PSYCHIATRY & NEUROLOGY

## 2024-10-15 ENCOUNTER — OFFICE VISIT (OUTPATIENT)
Dept: PULMONOLOGY | Facility: CLINIC | Age: 69
End: 2024-10-15
Payer: MEDICARE

## 2024-10-15 ENCOUNTER — HOSPITAL ENCOUNTER (OUTPATIENT)
Dept: RADIOLOGY | Facility: CLINIC | Age: 69
Discharge: HOME | End: 2024-10-15
Payer: MEDICARE

## 2024-10-15 VITALS
SYSTOLIC BLOOD PRESSURE: 144 MMHG | HEART RATE: 68 BPM | DIASTOLIC BLOOD PRESSURE: 81 MMHG | OXYGEN SATURATION: 97 % | BODY MASS INDEX: 31.89 KG/M2 | HEIGHT: 63 IN | WEIGHT: 180 LBS | TEMPERATURE: 98.1 F

## 2024-10-15 DIAGNOSIS — R06.09 DYSPNEA ON EXERTION: ICD-10-CM

## 2024-10-15 PROCEDURE — 1159F MED LIST DOCD IN RCRD: CPT | Performed by: NURSE PRACTITIONER

## 2024-10-15 PROCEDURE — 3008F BODY MASS INDEX DOCD: CPT | Performed by: NURSE PRACTITIONER

## 2024-10-15 PROCEDURE — 71046 X-RAY EXAM CHEST 2 VIEWS: CPT

## 2024-10-15 PROCEDURE — 71046 X-RAY EXAM CHEST 2 VIEWS: CPT | Performed by: RADIOLOGY

## 2024-10-15 PROCEDURE — 99215 OFFICE O/P EST HI 40 MIN: CPT | Performed by: NURSE PRACTITIONER

## 2024-10-15 PROCEDURE — 99205 OFFICE O/P NEW HI 60 MIN: CPT | Performed by: NURSE PRACTITIONER

## 2024-10-15 RX ORDER — BUDESONIDE AND FORMOTEROL FUMARATE DIHYDRATE 80; 4.5 UG/1; UG/1
2 AEROSOL RESPIRATORY (INHALATION)
Qty: 10.2 G | Refills: 11 | Status: SHIPPED | OUTPATIENT
Start: 2024-10-15 | End: 2025-10-15

## 2024-10-15 NOTE — PROGRESS NOTES
Patient: Shania Quintero    [id number]  : 1955 -- AGE 69 y.o.    Provider: MARIXA Cox-CNP     Location [facility]   Service Date: 10/15/2024              [facility]  New Visit Note      HISTORY OF PRESENT ILLNESS     The patient's referring provider is: Tasia Pretty MD    Neurology:  - Dr. Moreno   ENT: [facility] - Dr. Davis     HISTORY OF PRESENT ILLNESS   Shania Quintero is a 69 y.o. female who presents to a [facility] for an evaluation with concerns of Consult (Breath heavy). I have independently interviewed and examined the patient in the office and reviewed available records.    Current History    On today's visit, the patient reports she has a history of asthma, but she feels it is really mild. She has been to several doctors lately. She states 2-3 weeks ago she had a biopsy on her thyroid for a nodule (benign). She is seeing a neurologist - recent MRI and EEG. She is seeing neurologist upcoming. She states she has had 3 concussions. She has had several falls - last in . States previous hemorrhage, but that was a while ago. She has had issues with memory and bad balance - has been going on for a year, but has been happening more frequently. She states it is happening once a week - works at [employer] and will notice with turning she can lose her balance. She has been getting issues with vertigo this summer - would have to put arm out to touch the wall. It only lasted about 10 seconds. She has not had it for a few months, but when it was occurring it was happening frequently. She was also waking up daily with a headache. No headaches recently.     She had breast reduction 20 years ago. She has right breast pain - occurs after exercise/ walking a lot. She is not sure if it is scar tissues from her surgery. She has had mammograms over the last few years and told they were normal. She  states pain has been going on for 2 years. She has been having chest pains - midsternal and only last a short time. She states over 8 months she has only has 2x .     She states she has had issues with breathing more heavy. She states she has PERDUE with walking into work and noticed it starting about year ago. She states she has not felt it has been getting worse. She states some days are worse than others. She states she will have sporadic SOB at rest - will happen when she brushes her teeth. She states at times she will have SOB with walking room to room at times. She states it is sporadic and majority of the time she is able to do what she needs to do. She was told she had asthma, but she never felt she had it. She has had issues with her lung for a long time- 30 years ago went to [facility]. She kept getting bronchitis- at times on antibiotics for several months. She felt they were treating the symptoms not the cause. She started taking vitamin C daily and bronchitis episodes slowed down -- was down to once a year / further and further apart. She states she has not had episode of bronchitis for about 8 years. She states she had a hiatal hernia. She has PRN albuterol - she was using it more when it was humid. She states it really depends on the weather. She states the albuterol is helpful when she uses it. She wakes up in the morning with runny nose /sinus congestion/ post nasal drip. She states then they will be ok throughout the day. She has cleared her throat in clinic today. She had had esophagram last week at [facility] - she states she denies things going down the wrong pipe, but they get caught in her throat. She states its more solids she is eating. She will at times have coughing with drinking water, but that often.  She states she has had issues with both solids/ liquids for about a year. She denies cough being productive - more just coughing if stuff gets stuck.  She states the dust and chemicals at work she feels  "may trigger her breathing - it happens when she walks in the door. She denies any wheezing. She denies any GERD.      ACT today 20    Previous pulmonary history: She was previously told she has asthma.     Inhalers/nebulized medications: Prn albuterol     Hospitalization History: She has not been hospitalized over the last year for breathing related problem.    Sleep history: She is waiting to get a sleep apnea test.       ALLERGIES AND MEDICATIONS     ALLERGIES  Allergies   Allergen Reactions    Niacin Unknown, Other and Shortness of breath     \"feels like inside of body is on fire\"       MEDICATIONS  Current Outpatient Medications   Medication Sig Dispense Refill    albuterol 90 mcg/actuation inhaler Inhale 2 puffs every 4 hours if needed for wheezing. 18 g 11    ascorbic acid (Vitamin C) 1,000 mg tablet Take 1 tablet (1,000 mg) by mouth once daily.      b complex vitamins (Super B-50 Complex) capsule Take 1 capsule by mouth once daily.      biotin 10 mg tablet Take 1 tablet (10 mg) by mouth once daily.      cholecalciferol (Vitamin D-3) 25 MCG (1000 UT) tablet Take 1 tablet (25 mcg) by mouth once daily.      CINNAMON BARK ORAL Take by mouth.      coenzyme Q-10 200 mg capsule Take 1 capsule (200 mg) by mouth once daily.      cyanocobalamin (Vitamin B-12) 1,000 mcg tablet Take 1 tablet (1,000 mcg) by mouth once daily.      ibuprofen (AdviL) 200 mg tablet Take 1 tablet (200 mg) by mouth.      L. acidophilus/Bifid. animalis 32 billion cell capsule Take 1 capsule by mouth once daily.      multivitamin with minerals (Centrum) tablet Take 1 tablet by mouth once daily.      pravastatin (Pravachol) 20 mg tablet Take 1 tablet (20 mg) by mouth once daily at bedtime. 90 tablet 3    resveratroL 100 mg capsule Take 1 tablet by mouth once daily.      triamcinolone (Kenalog) 0.1 % cream Apply to affected area twice daily as needed. 453 g 1     No current facility-administered medications for this visit.         PAST HISTORY "     PAST MEDICAL HISTORY  - breast reduction 20 years ago - pain on right breast - she is not sure if its related to her surgery. She has had mammograms regularly and did not show anything - pain has been going on for 2 years.   - hiatal hernia   - asthma   - chronic bronchitis     PAST SURGICAL HISTORY  Past Surgical History:   Procedure Laterality Date    BREAST SURGERY  2016    Breast Surgery Reduction Procedure     SECTION, CLASSIC  2016     Section    COLONOSCOPY  2017    Complete Colonoscopy    OTHER SURGICAL HISTORY  2020    Endoscopy    OTHER SURGICAL HISTORY  2022    Complete colonoscopy    OTHER SURGICAL HISTORY  2016    Cervical Cerclage During Pregnancy       IMMUNIZATION HISTORY  Immunization History   Administered Date(s) Administered    Flu vaccine (IIV4), preservative free *Check age/dose* 2018, 2019, 2019    Flu vaccine, trivalent, preservative free, HIGH-DOSE, age 65y+ (Fluzone) 2020, 2021    Influenza, Seasonal, Quadrivalent, Adjuvanted 2023, 2023    Influenza, Unspecified 2023    Influenza, seasonal, injectable 2018, 2019, 2019    Pfizer COVID-19 vaccine, 12 years and older, (30mcg/0.3mL) (Comirnaty) 2023, 2024    Pfizer Purple Cap SARS-CoV-2 2021, 2021, 10/30/2021    Pneumococcal Conjugate PCV 7 1955    Pneumococcal polysaccharide vaccine, 23-valent, age 2 years and older (PNEUMOVAX 23) 2014, 2020    Tdap vaccine, age 7 year and older (BOOSTRIX, ADACEL) 2014    Zoster vaccine, recombinant, adult (SHINGRIX) 2020, 2021       SOCIAL HISTORY  Smoking: never- 2nd hand smoke exposures at work - Uncle used to be her boss.   Alcohol: socially   Illicit drugs: none     OCCUPATIONAL/ENVIRONMENTAL HISTORY  Currently works at [employer] - has worked there for 9 years. 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 She was in office for 14 years - felt she could smell it.     FAMILY HISTORY  FAMILY HISTORY: No family Hx of lung disease or lung cancer.  Mom - dementia and alzheimers     RESULTS/DATA     Pulmonary Function Test Results     2/16/24 - FeV1/FVC 0.79/ FEV1 1.53 (86% no BD resp)/ FVC 2.58 (98%)/ TLC 3.63 ( 82%)/ DLCO 78%     Chest Radiograph     XR chest 2 views     IMPRESSION:  No acute cardiopulmonary process.    Electronically signed by:  Barry Valenzuela MD  10/28/2023 07:19 AM EDT Workstation: [id number]  Technologist:  [email or web address]  Dictated By:   BARRY VALENZUELA MD  Signed By:     BARRY VALENZUELA MD    Signed Out:    10/28/23 07:19:48      No orders to display        Chest CT Scan     None on record     Echocardiogram     2/15/24 - EF 55-60%. RA normal size, RV normal size and function     Other testing/ Labs      Eosinophils Absolute (x10E9/L)   Date Value   09/28/2023 0.36   06/23/2022 0.38   09/17/2019 0.51     No results found for: \"IGE\"    REVIEW OF SYSTEMS     REVIEW OF SYSTEMS  Review of Systems   Constitutional:  Negativend fever. + fatigue   HENT:  Negative for congestion, postnasal drip, rhinorrhea, sinus pressure and voice change.    Eyes:  Negative for pain and visual disturbance.   Cardiovascular:  Negative for chest pain, palpitations and leg swelling.   Gastrointestinal:  Negative for abdominal pain, diarrhea, nausea and vomiting.   Endocrine: Negative for cold intolerance and heat intolerance.   Musculoskeletal:  Negative for arthralgias, back pain, joint swelling and myalgias.   Skin:  Negative for rash.   Neurological:  Negative for dizziness, weakness, numbness and headaches.   Psychiatric/Behavioral:  Negative for agitation. + anxiety         PHYSICAL EXAM     VITAL SIGNS: /81 (BP Location: Left arm, Patient Position: Sitting, BP Cuff Size: Large adult)   Pulse 68   Temp 36.7 °C (98.1 °F)   Ht 1.588 m (5' 2.5\")   Wt 81.6 kg (180 lb)   SpO2 97%   BMI 32.40 kg/m²  "     CURRENT WEIGHT: [unfilled]  BMI: [unfilled]  PREVIOUS WEIGHTS:  Wt Readings from Last 3 Encounters:   10/15/24 81.6 kg (180 lb)   08/29/24 81.7 kg (180 lb 1.6 oz)   08/12/24 82.1 kg (181 lb)       Physical Exam  Vitals reviewed.   Constitutional:       General: She is not in acute distress.     Appearance: Normal appearance. She is not ill-appearing or toxic-appearing.   HENT:      Head: Normocephalic.      Nose: No rhinorrhea.   Cardiovascular:      Rate and Rhythm: Normal rate and regular rhythm.      Heart sounds: Normal heart sounds.   Pulmonary:      Effort: Pulmonary effort is normal. No respiratory distress.      Breath sounds: Normal breath sounds. No stridor. No wheezing, rhonchi or rales.   Abdominal:      General: Abdomen is flat.   Musculoskeletal:         General: Normal range of motion.      Right lower leg: No edema.      Left lower leg: No edema.   Skin:     General: Skin is warm and dry.      Nails: There is no clubbing.   Neurological:      General: No focal deficit present.      Mental Status: She is alert and oriented to person, place, and time. Slight speech delay/ aphasia at times.   Psychiatric:         Mood and Affect: Mood normal.         Behavior: Behavior normal.         Judgment: Judgment normal.         ASSESSMENT/PLAN     Dyspnea on exertion: hx of asthma. Pfts without obstruction or BD response. CXR 10/2023 without acute cardiopulmonary process. Echo with normal EF. Getting worked up by neurology   - continue albuterol 2 puffs every 4-6 hours   - start symbicort 80/4.5 2 puffs twice daily - rinse mouth out afterwards   - continue work up from neurology - unclear given dysphagia if testing for neuromuscular disorder   -  recent esophagram - unclear if she has had MBS as well - seeing ENT at [facility]   - will get updated Pfts - including MIP/MEP and sitting and lying -- [phone]   - will get SNIFF test to evaluate diaphragm -- [phone]   - go down to radiology and get CXR today      Thank you for visiting the Pulmonary clinic today!   Return to clinic  1-2 months after testing  or sooner if needed   Genesis Landeros CNP  My office -  [phone]- Nina is my . Alyssa is my nurse [phone].   Radiology scheduling [phone]   Appointment scheduling [phone]   Pulmonary function testing - [phone]

## 2024-10-15 NOTE — PATIENT INSTRUCTIONS
Dyspnea on exertion: hx of asthma. Pfts without obstruction or BD response. CXR 10/2023 without acute cardiopulmonary process. Echo with normal EF. Getting worked up by neurology   - continue albuterol 2 puffs every 4-6 hours   - start symbicort 80/4.5 2 puffs twice daily - rinse mouth out afterwards   - continue work up from neurology - unclear given dysphagia if testing for neuromuscular disorder   -  recent esophagram - unclear if she has had MBS as well - seeing ENT at [facility]   - will get updated Pfts - including MIP/MEP and sitting and lying -- [phone]   - will get SNIFF test to evaluate diaphragm -- [phone]   - go down to radiology and get CXR today     Thank you for visiting the Pulmonary clinic today!   Return to clinic  1-2 months after testing  or sooner if needed   Genesis Landeros CNP  My office -  [phone]- Nina is my . Alyssa is my nurse [phone].   Radiology scheduling [phone]   Appointment scheduling [phone]   Pulmonary function testing - [phone]

## 2024-10-15 NOTE — PROGRESS NOTES
Patient: Shania Quintero    [id number]  : 1955 -- AGE 69 y.o.    Provider: MARIXA Cox-CNP     Location [facility]   Service Date: 10/15/2024              [facility]  New Visit Note      HISTORY OF PRESENT ILLNESS     The patient's referring provider is: Tasia Pretty MD    HISTORY OF PRESENT ILLNESS   Shania Quintero is a 69 y.o. female who presents to a [facility] for an evaluation with concerns of Consult (Breath heavy). I have independently interviewed and examined the patient in the office and reviewed available records.    Current History    On today's visit, the patient reports she has a history of asthma, but she feels it is really mild. She has been to several doctors lately. She states 2-3 weeks ago she had a biopsy on her thyroid for a nodule (benign). She is seeing a neurologist - recent MRI and EEG. She is seeing neurologist upcoming. She states she has had 3 concussions. She has had several falls - last in . States previous hemorrhage, but that was a while ago. She has had issues with memory and bad balance - has been going on for a year, but has been happening more frequently. She states it is happening once a week - works at [employer] and will notice with turning she can lose her balance. She has been getting issues with vertigo this summer - would have to put arm out to touch the wall. It only lasted about 10 seconds. She has not had it for a few months, but when it was occurring it was happening frequently. She was also waking up daily with a headache. No headaches recently.      She had breast reduction 20 years ago. She has right breast pain - occurs after exercise/ walking a lot. She is not sure if it is scar tissues from her surgery. She has had mammograms over the last few years and told they were normal. She states pain has been going on for 2 years. She has been having  chest pains - midsternal and only last a short time. She states over 8 months she has only has 2x .      She states she has had issues with breathing more heavy. She states she has PERDUE with walking into work and noticed it starting about year ago. She states she has not felt it has been getting worse. She states she will have sporadic SOB at rest - will happen when she brushes her teeth. She states at times she will have SOB with walking room to room at times. She states it is sporadic and majority of the time she is able to do what she needs to do. She was told she had asthma, but she never felt she had it. She has had issues with her lung for a long time- 30 years ago went to [facility]. She kept getting bronchitis- at times on antibiotics for several months. She felt they were treating the symptoms not the cause. She started taking vitamin C daily and bronchitis episodes slowed down -- was down to once a year / further and further apart. She states she has not had episode of bronchitis for about 8 years. She states she had a hiatal hernia. She has PRN albuterol - she was using it more when it was humid. She states it really depends on the weather. She wakes up in the morning with runny nose /sinus congestion/ post nasal drip. She states then they will be ok throughout the day. She has cleared her throat in clinic today. She had had esophagram last week at [facility] - she states she denies things going down the wrong pipe, but they get caught in her throat. She states its more solids she is eating. She will at times have coughing with drinking water, but that often.    cough   wheezing   PERDUE   SOB at rest   GERD  cough   wheezing   PERDUE   SOB at rest   allergies   GERD   chest pain   snoring     Previous pulmonary history: She has no history of recurrent infections, or lung disease as a child.  She had no previous lung hx, never on oxygen or inhaler therapy.   She was previously told she has . She currently is on no  "supplemental oxygen.  She has never been to pulmonary rehab. Does not recall having AECOPD requiring antibiotics or prednisone.    Inhalers/nebulized medications:     Hospitalization History: She has not been hospitalized over the last year for breathing related problem.    Sleep history:  Denies snoring, apnea, feeling tired during the day or taking naps during the day.   Complains of snoring, apnea, feeling tired during the day, and taking naps during the day.   STOP-BANG score of     ALLERGIES AND MEDICATIONS     ALLERGIES  Allergies   Allergen Reactions    Niacin Unknown, Other and Shortness of breath     \"feels like inside of body is on fire\"       MEDICATIONS  Current Outpatient Medications   Medication Sig Dispense Refill    albuterol 90 mcg/actuation inhaler Inhale 2 puffs every 4 hours if needed for wheezing. 18 g 11    ascorbic acid (Vitamin C) 1,000 mg tablet Take 1 tablet (1,000 mg) by mouth once daily.      b complex vitamins (Super B-50 Complex) capsule Take 1 capsule by mouth once daily.      biotin 10 mg tablet Take 1 tablet (10 mg) by mouth once daily.      cholecalciferol (Vitamin D-3) 25 MCG (1000 UT) tablet Take 1 tablet (25 mcg) by mouth once daily.      CINNAMON BARK ORAL Take by mouth.      coenzyme Q-10 200 mg capsule Take 1 capsule (200 mg) by mouth once daily.      cyanocobalamin (Vitamin B-12) 1,000 mcg tablet Take 1 tablet (1,000 mcg) by mouth once daily.      ibuprofen (AdviL) 200 mg tablet Take 1 tablet (200 mg) by mouth.      L. acidophilus/Bifid. animalis 32 billion cell capsule Take 1 capsule by mouth once daily.      multivitamin with minerals (Centrum) tablet Take 1 tablet by mouth once daily.      pravastatin (Pravachol) 20 mg tablet Take 1 tablet (20 mg) by mouth once daily at bedtime. 90 tablet 3    resveratroL 100 mg capsule Take 1 tablet by mouth once daily.      triamcinolone (Kenalog) 0.1 % cream Apply to affected area twice daily as needed. 453 g 1     No current " facility-administered medications for this visit.         PAST HISTORY     PAST MEDICAL HISTORY        PAST SURGICAL HISTORY  Past Surgical History:   Procedure Laterality Date    BREAST SURGERY  2016    Breast Surgery Reduction Procedure     SECTION, CLASSIC  2016     Section    COLONOSCOPY  2017    Complete Colonoscopy    OTHER SURGICAL HISTORY  2020    Endoscopy    OTHER SURGICAL HISTORY  2022    Complete colonoscopy    OTHER SURGICAL HISTORY  2016    Cervical Cerclage During Pregnancy       IMMUNIZATION HISTORY  Immunization History   Administered Date(s) Administered    Flu vaccine (IIV4), preservative free *Check age/dose* 2018, 2019, 2019    Flu vaccine, trivalent, preservative free, HIGH-DOSE, age 65y+ (Fluzone) 2020, 2021    Influenza, Seasonal, Quadrivalent, Adjuvanted 2023, 2023    Influenza, Unspecified 2023    Influenza, seasonal, injectable 2018, 2019, 2019    Pfizer COVID-19 vaccine, 12 years and older, (30mcg/0.3mL) (Comirnaty) 2023, 2024    Pfizer Purple Cap SARS-CoV-2 2021, 2021, 10/30/2021    Pneumococcal Conjugate PCV 7 1955    Pneumococcal polysaccharide vaccine, 23-valent, age 2 years and older (PNEUMOVAX 23) 2014, 2020    Tdap vaccine, age 7 year and older (BOOSTRIX, ADACEL) 2014    Zoster vaccine, recombinant, adult (SHINGRIX) 2020, 2021       SOCIAL HISTORY  Smoking: never  Alcohol:   Illicit drugs:      OCCUPATIONAL/ENVIRONMENTAL HISTORY  Previously worked as: ***  Currently works as: ***  {DOES/DOES NOT EC:34903:::1} have known exposure to asbestos, silica, beryllium or inhaled metals.  {DOES/DOES NOT EC:06063:::1} have exposure to birds or exotic animals.    FAMILY HISTORY  FAMILY HISTORY: No family Hx of lung disease or lung cancer.    RESULTS/DATA     Pulmonary Function Test Results     24 -     Chest  "Radiograph     XR chest 2 views     Narrative  PROCEDURE:  XR CHEST 2 VIEWS    HISTORY:  COUGH    COMPARISON:  None    FINDINGS:  The lungs are clear bilaterally.  There is no focal infiltrate, pleural effusion or pneumothorax. The cardiomediastinal contours are unremarkable. There are no acute bony or soft tissue abnormalities.  There is no blunting of the costophrenic angles on the lateral view.    IMPRESSION:  No acute cardiopulmonary process.    Electronically signed by:  Barry Valenzuela MD  10/28/2023 07:19 AM EDT Workstation: [id number]  Technologist:    Dictated By:   BARRY VALENZUELA MD  Signed By:     BARRY VALENZUELA MD    Signed Out:    10/28/23 07:19:48      No orders to display        Chest CT Scan     No results found for this or any previous visit from the past 365 days.           Echocardiogram     No results found for this or any previous visit from the past 365 days.         Other testing/ Labs      Eosinophils Absolute (x10E9/L)   Date Value   09/28/2023 0.36   06/23/2022 0.38   09/17/2019 0.51     No results found for: \"IGE\"    REVIEW OF SYSTEMS     REVIEW OF SYSTEMS  Review of Systems      PHYSICAL EXAM     VITAL SIGNS: /81 (BP Location: Left arm, Patient Position: Sitting, BP Cuff Size: Large adult)   Pulse 68   Temp 36.7 °C (98.1 °F)   Ht 1.588 m (5' 2.5\")   Wt 81.6 kg (180 lb)   SpO2 97%   BMI 32.40 kg/m²      CURRENT WEIGHT: [unfilled]  BMI: [unfilled]  PREVIOUS WEIGHTS:  Wt Readings from Last 3 Encounters:   10/15/24 81.6 kg (180 lb)   08/29/24 81.7 kg (180 lb 1.6 oz)   08/12/24 82.1 kg (181 lb)       Physical Exam    ASSESSMENT/PLAN     Ms. Quintero is a 69 y.o. female and  has a past medical history of Body mass index (BMI) 32.0-32.9, adult (11/30/2020), Changes in skin texture (09/17/2019), Dermatitis, unspecified (01/22/2018), Encounter for immunization (06/30/2020), Encounter for screening for malignant neoplasm of colon (01/16/2017), Immunization not carried out because of patient " refusal (08/23/2016), Localized edema (08/23/2016), Localized swelling, mass and lump, right upper limb (01/28/2020), Other benign neoplasm of skin of unspecified ear and external auricular canal (01/22/2018), Other conditions influencing health status (04/06/2020), Other specified congenital deformities of hip, Pain in joints of unspecified hand (01/30/2020), Pain in left hip (08/27/2019), Pain in right hip (03/17/2022), Personal history of other (healed) physical injury and trauma, Personal history of other diseases of the musculoskeletal system and connective tissue (03/17/2022), Personal history of other diseases of the nervous system and sense organs (03/17/2022), Personal history of other diseases of the respiratory system, Personal history of other diseases of the respiratory system (10/22/2018), Personal history of other diseases of the respiratory system (11/21/2016), Personal history of other drug therapy (01/28/2020), Personal history of other endocrine, nutritional and metabolic disease, Personal history of other infectious and parasitic diseases (02/20/2017), Personal history of other malignant neoplasm of large intestine, Sprain of unspecified ligament of left ankle, initial encounter (04/28/2021), Trigger thumb, unspecified thumb (01/03/2022), Unspecified abdominal pain (11/30/2020), and Unspecified abdominal pain. She was referred to the [facility] for evaluation of ***    Problem List and Orders  {Assess/PlanSC.S. [facility]ott [facility]als:11457}    Assessment and Plan / Recommendations:  Problem List Items Addressed This Visit       Dyspnea on exertion

## 2024-10-17 ENCOUNTER — APPOINTMENT (OUTPATIENT)
Dept: NEUROLOGY | Facility: CLINIC | Age: 69
End: 2024-10-17
Payer: MEDICARE

## 2024-10-17 VITALS
RESPIRATION RATE: 20 BRPM | WEIGHT: 178.4 LBS | HEIGHT: 63 IN | TEMPERATURE: 97.7 F | BODY MASS INDEX: 31.61 KG/M2 | HEART RATE: 74 BPM | DIASTOLIC BLOOD PRESSURE: 90 MMHG | SYSTOLIC BLOOD PRESSURE: 146 MMHG

## 2024-10-17 DIAGNOSIS — G47.19 EXCESSIVE DAYTIME SLEEPINESS: ICD-10-CM

## 2024-10-17 DIAGNOSIS — R41.3 MEMORY LOSS: Primary | ICD-10-CM

## 2024-10-17 DIAGNOSIS — R20.2 PARESTHESIAS: ICD-10-CM

## 2024-10-17 DIAGNOSIS — R47.89 WORD FINDING DIFFICULTY: ICD-10-CM

## 2024-10-17 DIAGNOSIS — R26.81 UNSTEADY GAIT: ICD-10-CM

## 2024-10-17 DIAGNOSIS — F45.8 BRUXISM: ICD-10-CM

## 2024-10-17 DIAGNOSIS — E04.1 THYROID NODULE: ICD-10-CM

## 2024-10-17 PROCEDURE — 1159F MED LIST DOCD IN RCRD: CPT | Performed by: PSYCHIATRY & NEUROLOGY

## 2024-10-17 PROCEDURE — 99215 OFFICE O/P EST HI 40 MIN: CPT | Performed by: PSYCHIATRY & NEUROLOGY

## 2024-10-17 PROCEDURE — 3008F BODY MASS INDEX DOCD: CPT | Performed by: PSYCHIATRY & NEUROLOGY

## 2024-10-17 PROCEDURE — 1036F TOBACCO NON-USER: CPT | Performed by: PSYCHIATRY & NEUROLOGY

## 2024-10-17 PROCEDURE — 1160F RVW MEDS BY RX/DR IN RCRD: CPT | Performed by: PSYCHIATRY & NEUROLOGY

## 2024-10-17 PROCEDURE — G2211 COMPLEX E/M VISIT ADD ON: HCPCS | Performed by: PSYCHIATRY & NEUROLOGY

## 2024-10-17 ASSESSMENT — ENCOUNTER SYMPTOMS
DEPRESSION: 0
LOSS OF SENSATION IN FEET: 0
OCCASIONAL FEELINGS OF UNSTEADINESS: 1

## 2024-10-17 NOTE — PATIENT INSTRUCTIONS
The patient is still complaining of memory issues.  The patient is neuropsychiatric testing will be done this February.  I will hold on ordering a CT amyloid PET until her neuropsych testing has been completed.  The patient needs to stay as active as she can both mentally and physically.  The patient can use symptomatic pain medicines for severe headaches and neck pain.  The patient can certainly use Motrin or Tylenol.  The patient should continue her medicines and stroke risk factor modification.  The patient does need to get a mouthguard to treat her bruxism.  I did stress the importance of the patient getting her sleep test.  I would like to have a copy of the patient's sleep study when it is done.  The patient has a mildly elevated blood pressure and needs to follow-up with her primary care doctor for this.  I discussed all these issues in detail with the patient and answered all of her questions.  The patient will follow up with me in 1 year.

## 2024-10-17 NOTE — PROGRESS NOTES
Subjective     Shania Del Realick 69 y.o.  HPI  The patient states that her memory feels that her memory is slightly worse compared to when she saw me last.  She is still forgetting things when she goes into her room and she is having difficulty remembering people's names.    The patient had an esophagram done that showed a small type I hiatal hernia and some dysmotility.  The patient's MRI of the brain showed mild to moderate parenchymal brain volume loss and some white matter changes but no acute process was noted.  The patient did have some abnormalities noted on the gradient echo weighted sequences with a small foci of previous microhemorrhage noted.  The patient is EEG showed a mild diffuse encephalopathy but no seizure activity was noted.  The patient states that she has neuropsychiatric testing scheduled for February 2025.    The patient states that she has still had some vision changes where she sees lightning bolts and she has seen ophthalmology for this.  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  The patient is still not received her sleep study.  I patient states that she does not have a mouthguard for bruxism.    The patient states that she has not had any recent falls.    The patient had a thyroid nodule biopsied and it was benign.    Review of Systems   All other systems reviewed and are negative.       Patient Active Problem List   Diagnosis    Asthma    Bruxism    Carpal tunnel syndrome of left wrist    Chronic constipation    Chronic daily headache    Inverted T wave    Memory changes    Mixed hyperlipidemia    Orthostatic dizziness    Other melanin hyperpigmentation    Other seborrheic dermatitis    Other skin changes due to chronic exposure to nonionizing radiation    Overactive bladder    Paresthesias    Prediabetes    Hemangioma of skin and subcutaneous tissue    Psoriasis    Purpura (CMS-HCC)    Right hand weakness    Scoliosis of cervical spine    Seborrheic keratoses    Unsteady gait    Urge  incontinence    Urinary incontinence    Vision changes    Class 1 obesity with alveolar hypoventilation and body mass index (BMI) of 30.0 to 30.9 in adult    Dyspnea on exertion    Excessive daytime sleepiness    Menopause    Thyroid nodule    Vertigo    Medicare annual wellness visit, subsequent    Screening for multiple conditions    Cardiac risk counseling    Stress and adjustment reaction    Visit for screening mammogram    Vitamin D deficiency        Past Medical History:   Diagnosis Date    Body mass index (BMI) 32.0-32.9, adult 11/30/2020    Body mass index (BMI) of 32.0 to 32.9 in adult    Changes in skin texture 09/17/2019    Scab    Dermatitis, unspecified 01/22/2018    Facial dermatitis    Encounter for immunization 06/30/2020    Need for vaccination for zoster    Encounter for screening for malignant neoplasm of colon 01/16/2017    Colon cancer screening    Immunization not carried out because of patient refusal 08/23/2016    Influenza vaccine refused    Localized edema 08/23/2016    Lower leg edema    Localized swelling, mass and lump, right upper limb 01/28/2020    Localized swelling on right hand    Other benign neoplasm of skin of unspecified ear and external auricular canal 01/22/2018    Ear canal papilloma    Other conditions influencing health status 04/06/2020    History of cough    Other specified congenital deformities of hip     Hip dysplasia    Pain in joints of unspecified hand 01/30/2020    Pain in multiple finger joints    Pain in left hip 08/27/2019    Left hip pain    Pain in right hip 03/17/2022    Lateral pain of right hip    Personal history of other (healed) physical injury and trauma     History of whiplash injury to neck    Personal history of other diseases of the musculoskeletal system and connective tissue 03/17/2022    History of low back pain    Personal history of other diseases of the nervous system and sense organs 03/17/2022    History of acute conjunctivitis    Personal  history of other diseases of the respiratory system     History of asthma    Personal history of other diseases of the respiratory system 10/22/2018    History of acute bronchitis    Personal history of other diseases of the respiratory system 2016    History of bronchitis    Personal history of other drug therapy 2020    History of pneumococcal vaccination    Personal history of other endocrine, nutritional and metabolic disease     History of hyperlipidemia    Personal history of other infectious and parasitic diseases 2017    History of viral infection    Personal history of other malignant neoplasm of large intestine     History of malignant neoplasm of colon    Sprain of unspecified ligament of left ankle, initial encounter 2021    Sprain of ankle, left    Trigger thumb, unspecified thumb 2022    Trigger thumb    Unspecified abdominal pain 2020    Abdominal cramping    Unspecified abdominal pain     Abdominal pain, left lateral        Past Surgical History:   Procedure Laterality Date    BREAST SURGERY  2016    Breast Surgery Reduction Procedure     SECTION, CLASSIC  2016     Section    COLONOSCOPY  2017    Complete Colonoscopy    OTHER SURGICAL HISTORY  2020    Endoscopy    OTHER SURGICAL HISTORY  2022    Complete colonoscopy    OTHER SURGICAL HISTORY  2016    Cervical Cerclage During Pregnancy        Social History     Socioeconomic History    Marital status: Single     Spouse name: Not on file    Number of children: Not on file    Years of education: Not on file    Highest education level: Not on file   Occupational History    Not on file   Tobacco Use    Smoking status: Never     Passive exposure: Never    Smokeless tobacco: Never   Substance and Sexual Activity    Alcohol use: Yes     Comment: occasionally    Drug use: Never    Sexual activity: Not on file   Other Topics Concern    Not on file   Social History Narrative  "   Not on file     Social Drivers of Health     Financial Resource Strain: Not on file   Food Insecurity: Not on file   Transportation Needs: Not on file   Physical Activity: Not on file   Stress: Not on file   Social Connections: Not on file   Intimate Partner Violence: Not on file   Housing Stability: Not on file        Family History   Problem Relation Name Age of Onset    Dementia Mother      Thyroid disease Mother      Other (congenital clubhand) Father      Stroke Father      Other (connective tissue disease) Sister      Fibromyalgia Sister      Scoliosis Sister      Depression Brother      Seizures Brother      Dementia Maternal Grandmother      Muscular dystrophy Other          Current Outpatient Medications   Medication Instructions    albuterol 90 mcg/actuation inhaler 2 puffs, inhalation, Every 4 hours PRN    ascorbic acid (Vitamin C) 1,000 mg tablet 1 tablet, Daily    b complex vitamins (Super B-50 Complex) capsule 1 capsule, Daily    biotin 10 mg tablet 1 tablet, Daily    budesonide-formoteroL (Symbicort) 80-4.5 mcg/actuation inhaler 2 puffs, inhalation, 2 times daily RT, Rinse mouth with water after use to reduce aftertaste and incidence of candidiasis. Do not swallow.    cholecalciferol (Vitamin D-3) 25 MCG (1000 UT) tablet 1 tablet, Daily    CINNAMON BARK ORAL Take by mouth.    coenzyme Q-10 200 mg capsule 1 capsule, Daily    cyanocobalamin (Vitamin B-12) 1,000 mcg tablet 1 tablet, Daily    ibuprofen (ADVIL) 200 mg    L. acidophilus/Bifid. animalis 32 billion cell capsule 1 capsule, Daily    multivitamin with minerals (Centrum) tablet 1 tablet, Daily    pravastatin (PRAVACHOL) 20 mg, oral, Nightly    resveratroL 100 mg capsule 1 tablet, Daily    triamcinolone (Kenalog) 0.1 % cream Apply to affected area twice daily as needed.        Allergies   Allergen Reactions    Niacin Unknown, Other and Shortness of breath     \"feels like inside of body is on fire\"        Objective  /90 (BP Location: Right " "arm)   Pulse 74   Temp 36.5 °C (97.7 °F)   Resp 20   Ht 1.588 m (5' 2.5\")   Wt 80.9 kg (178 lb 6.4 oz)   BMI 32.11 kg/m²    GENERAL APPEARANCE:  No distress, alert and cooperative.     MENTAL STATE:  Orientation was normal to time, place and person. Recent and remote memory was intact.  Attention span and concentration were normal. Language testing was normal for comprehension, repetition, expression, and naming. Calculation was intact. The patient could correctly interpret a picture, and copy a diagram. General fund of knowledge was intact.  The patient's Mini-Mental status score was 29 out of 30.    CRANIAL NERVES:  Cranial nerves were normal.      CN 2- Visual Acuity  OD: 20/20 (corrected)   OS: 20/20 (corrected); visual fields full to confrontation.      CN 3, 4, 6-  Pupils round, 4 mm in diameter, equally reactive to light. No ptosis. EOMs normal alignment, full range of movement, no nystagmus     CN 5- Facial sensation intact bilaterally. Normal corneal reflexes.      CN 7- Normal and symmetric facial strength. Nasolabial folds symmetric.     CN 8- Hearing intact to finger rub, whisper.      CN 9- Palate elevates symmetrically. Normal gag reflex.      CN 11- Normal strength of shoulder shrug and neck turning      CN 12- Tongue midline, with normal bulk and strength; no fasciculations.     MOTOR:  Motor exam was normal. Muscle bulk and tone were normal in both upper and lower extremities. Muscle strength was 5/5 in distal and proximal muscles in both upper and lower extremities. No fasciculations, tremor or other abnormal movements were present.     GAIT: Station was stable with a normal base and negative Romberg sign. Gait was stable with a normal arm swing and speed. No ataxia, shuffling, steppage or waddling was noted. Tandem gait was intact. Postural reflexes were normal.     Assessment/Plan   The patient is still complaining of memory issues.  The patient is neuropsychiatric testing will be done this " February.  I will hold on ordering a CT amyloid PET until her neuropsych testing has been completed.  The patient needs to stay as active as she can both mentally and physically.  The patient can use symptomatic pain medicines for severe headaches and neck pain.  The patient can certainly use Motrin or Tylenol.  The patient should continue her medicines and stroke risk factor modification.  The patient does need to get a mouthguard to treat her bruxism.  I did stress the importance of the patient getting her sleep test.  I would like to have a copy of the patient's sleep study when it is done.  The patient has a mildly elevated blood pressure and needs to follow-up with her primary care doctor for this.  I discussed all these issues in detail with the patient and answered all of her questions.  The patient will follow up with me in 1 year.

## 2024-10-23 DIAGNOSIS — Z12.31 VISIT FOR SCREENING MAMMOGRAM: Primary | ICD-10-CM

## 2024-11-06 ENCOUNTER — APPOINTMENT (OUTPATIENT)
Dept: RADIOLOGY | Facility: CLINIC | Age: 69
End: 2024-11-06
Payer: MEDICARE

## 2024-11-11 ENCOUNTER — APPOINTMENT (OUTPATIENT)
Dept: RESPIRATORY THERAPY | Facility: HOSPITAL | Age: 69
End: 2024-11-11
Payer: MEDICARE

## 2024-12-05 ENCOUNTER — APPOINTMENT (OUTPATIENT)
Dept: PRIMARY CARE | Facility: CLINIC | Age: 69
End: 2024-12-05
Payer: MEDICARE

## 2024-12-05 VITALS
TEMPERATURE: 97.2 F | HEART RATE: 65 BPM | OXYGEN SATURATION: 98 % | BODY MASS INDEX: 31.24 KG/M2 | SYSTOLIC BLOOD PRESSURE: 109 MMHG | WEIGHT: 176.3 LBS | HEIGHT: 63 IN | DIASTOLIC BLOOD PRESSURE: 77 MMHG

## 2024-12-05 DIAGNOSIS — E66.811 CLASS 1 OBESITY WITH ALVEOLAR HYPOVENTILATION, SERIOUS COMORBIDITY, AND BODY MASS INDEX (BMI) OF 31.0 TO 31.9 IN ADULT: ICD-10-CM

## 2024-12-05 DIAGNOSIS — E66.2 CLASS 1 OBESITY WITH ALVEOLAR HYPOVENTILATION, SERIOUS COMORBIDITY, AND BODY MASS INDEX (BMI) OF 31.0 TO 31.9 IN ADULT: ICD-10-CM

## 2024-12-05 DIAGNOSIS — R13.19 ESOPHAGEAL DYSPHAGIA: ICD-10-CM

## 2024-12-05 DIAGNOSIS — R06.09 DYSPNEA ON EXERTION: Primary | ICD-10-CM

## 2024-12-05 PROCEDURE — 1160F RVW MEDS BY RX/DR IN RCRD: CPT | Performed by: INTERNAL MEDICINE

## 2024-12-05 PROCEDURE — 99213 OFFICE O/P EST LOW 20 MIN: CPT | Performed by: INTERNAL MEDICINE

## 2024-12-05 PROCEDURE — 3008F BODY MASS INDEX DOCD: CPT | Performed by: INTERNAL MEDICINE

## 2024-12-05 PROCEDURE — 1159F MED LIST DOCD IN RCRD: CPT | Performed by: INTERNAL MEDICINE

## 2024-12-05 PROCEDURE — 1124F ACP DISCUSS-NO DSCNMKR DOCD: CPT | Performed by: INTERNAL MEDICINE

## 2024-12-05 NOTE — PROGRESS NOTES
CHIEF COMPLAINT  Shortness of Breath    HISTORY OF PRESENT ILLNESS  Shania Quintero is a 69 y.o. female presents today for follow up of Shortness of Breath    HPI    History reviewed and updated.  Had esophagram for trouble swallowing, showed reduced peristalsis.  Patient states she has not had any follow-up yet.  Saw pulmonologist and additional testing ordered.  She has not yet scheduled this.  Also prescribed Symbicort, but has not been able to start it (could not get from pharmacy).   She is under eval of Dr. Moreno for memory and balance issues.    REVIEW OF SYSTEMS  Review of Systems   Constitutional:  Negative for chills and fever.   Eyes:  Positive for visual disturbance.   Respiratory:  Positive for shortness of breath. Negative for cough.    Cardiovascular:  Negative for chest pain, palpitations and leg swelling.   Gastrointestinal:  Negative for abdominal pain, constipation, diarrhea, nausea and vomiting.        Trouble swallowing   Neurological:  Positive for dizziness.        Poor balance       ALLERGIES  Niacin    MEDICATIONS  Current Outpatient Medications   Medication Instructions    albuterol 90 mcg/actuation inhaler 2 puffs, inhalation, Every 4 hours PRN    ascorbic acid (Vitamin C) 1,000 mg tablet 1 tablet, Daily    b complex vitamins (Super B-50 Complex) capsule 1 capsule, Daily    biotin 10 mg tablet 1 tablet, Daily    budesonide-formoteroL (Symbicort) 80-4.5 mcg/actuation inhaler 2 puffs, inhalation, 2 times daily RT, Rinse mouth with water after use to reduce aftertaste and incidence of candidiasis. Do not swallow.    cholecalciferol (Vitamin D-3) 25 MCG (1000 UT) tablet 1 tablet, Daily    CINNAMON BARK ORAL Take by mouth.    coenzyme Q-10 200 mg capsule 1 capsule, Daily    cyanocobalamin (Vitamin B-12) 1,000 mcg tablet 1 tablet, Daily    ibuprofen (ADVIL) 200 mg    L. acidophilus/Bifid. animalis 32 billion cell capsule 1 capsule, Daily    multivitamin with minerals (Centrum) tablet 1 tablet,  "Daily    pravastatin (PRAVACHOL) 20 mg, oral, Nightly    resveratroL 100 mg capsule 1 tablet, Daily    triamcinolone (Kenalog) 0.1 % cream Apply to affected area twice daily as needed.       TOBACCO USE  Social History     Tobacco Use   Smoking Status Never    Passive exposure: Never   Smokeless Tobacco Never       DEPRESSION SCREEN  Over the past 2 weeks, how often have you been bothered by any of the following problems?  Little interest or pleasure in doing things: Not at all  Feeling down, depressed, or hopeless: Not at all    SURGICAL HISTORY  Past Surgical History:  2016: BREAST SURGERY      Comment:  Breast Surgery Reduction Procedure  2016:  SECTION, CLASSIC      Comment:   Section  2017: COLONOSCOPY      Comment:  Complete Colonoscopy  2020: OTHER SURGICAL HISTORY      Comment:  Endoscopy  2022: OTHER SURGICAL HISTORY      Comment:  Complete colonoscopy  2016: OTHER SURGICAL HISTORY      Comment:  Cervical Cerclage During Pregnancy       OBJECTIVE    /77   Pulse 65   Temp 36.2 °C (97.2 °F)   Ht 1.588 m (5' 2.5\")   Wt 80 kg (176 lb 4.8 oz)   SpO2 98%   BMI 31.73 kg/m²    BMI: Estimated body mass index is 31.73 kg/m² as calculated from the following:    Height as of this encounter: 1.588 m (5' 2.5\").    Weight as of this encounter: 80 kg (176 lb 4.8 oz).    BP Readings from Last 3 Encounters:   24 109/77   10/17/24 146/90   10/15/24 144/81      Wt Readings from Last 3 Encounters:   24 80 kg (176 lb 4.8 oz)   10/17/24 80.9 kg (178 lb 6.4 oz)   10/15/24 81.6 kg (180 lb)        PHYSICAL EXAM  Physical Exam  Constitutional:       Appearance: Normal appearance.   HENT:      Head: Normocephalic and atraumatic.   Cardiovascular:      Rate and Rhythm: Normal rate and regular rhythm.      Pulses: Normal pulses.      Heart sounds: No murmur heard.  Pulmonary:      Effort: Pulmonary effort is normal. No respiratory distress.      Breath sounds: " "Normal breath sounds. No wheezing.   Abdominal:      General: There is no distension.      Palpations: Abdomen is soft.      Tenderness: There is no abdominal tenderness.   Musculoskeletal:      Right lower leg: No edema.      Left lower leg: No edema.   Neurological:      General: No focal deficit present.      Mental Status: She is alert and oriented to person, place, and time. Mental status is at baseline.   Psychiatric:         Mood and Affect: Mood normal.         Behavior: Behavior normal.         Thought Content: Thought content normal.         Judgment: Judgment normal.          ASSESSMENT AND PLAN  Assessment/Plan   Problem List Items Addressed This Visit       Class 1 obesity with alveolar hypoventilation and body mass index (BMI) of 31.0 to 31.9 in adult    Overview     Aim for 30 minutes of aerobic exercise, 5 times per week.  Try to cut back on processed foods, including foods made with flour, sugar, added salt, and saturated fat.           Dyspnea on exertion - Primary    Esophageal dysphagia     Routine labs ordered 8/29/24, advised to get these fasting labs drawn as soon as she is able.    Dyspnea - chronic.  CXR 10/23/23, echocardiogram 2/19/24, PFT's 2/16/24.  Referral to pulmonologist.  She is established now Genesis Landeros CNP.  I encouraged Shania to schedule the PFT\"s with MIP/MEP and SNIFF test.     Memory loss, headaches - following with Dr. Moreno.     Dysphagia - esophagram revealed slow peristalsis, advised to follow-up with Dr. Davis.      Mammogram 1/12/21, 2/2/22, 11/15/24.      Colonoscopy 5/24/22 - repeat 5 years.     Pap test 12/2021 normal.     Follow-up in 3 months.         "

## 2024-12-07 PROBLEM — D69.2 PURPURA (CMS-HCC): Status: RESOLVED | Noted: 2023-11-01 | Resolved: 2024-12-07

## 2024-12-07 PROBLEM — R13.19 ESOPHAGEAL DYSPHAGIA: Status: ACTIVE | Noted: 2024-12-07

## 2024-12-07 ASSESSMENT — ENCOUNTER SYMPTOMS
SHORTNESS OF BREATH: 1
NAUSEA: 0
VOMITING: 0
CONSTIPATION: 0
PALPITATIONS: 0
ABDOMINAL PAIN: 0
CHILLS: 0
FEVER: 0
COUGH: 0
DIZZINESS: 1
ROS GI COMMENTS: TROUBLE SWALLOWING
DIARRHEA: 0

## 2024-12-10 ENCOUNTER — APPOINTMENT (OUTPATIENT)
Dept: PULMONOLOGY | Facility: CLINIC | Age: 69
End: 2024-12-10
Payer: MEDICARE

## 2025-01-07 ENCOUNTER — APPOINTMENT (OUTPATIENT)
Dept: RESPIRATORY THERAPY | Facility: HOSPITAL | Age: 70
End: 2025-01-07
Payer: MEDICARE

## 2025-02-26 ENCOUNTER — OFFICE VISIT (OUTPATIENT)
Facility: CLINIC | Age: 70
End: 2025-02-26
Payer: MEDICARE

## 2025-02-26 DIAGNOSIS — F43.23 ADJUSTMENT DISORDER WITH MIXED ANXIETY AND DEPRESSED MOOD: ICD-10-CM

## 2025-02-26 DIAGNOSIS — R41.3 MEMORY CHANGES: Primary | Chronic | ICD-10-CM

## 2025-02-26 DIAGNOSIS — G31.84 MILD COGNITIVE IMPAIRMENT: ICD-10-CM

## 2025-02-26 DIAGNOSIS — R41.9 COGNITIVE COMPLAINTS: ICD-10-CM

## 2025-02-26 NOTE — PROGRESS NOTES
Neuropsychology Evaluation    Name: Shania Quintero  : 1955  MRN: [id number]  Referring Provider: Percy Moreno MD    Date of Service: 2025      Reason for Visit: Repeat neuropsychological evaluation due to ongoing memory difficulty and cognitive concerns.     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  Processing speed: {performance descriptors:79544}.  Visuospatial abilities: {performance descriptors:98085}.  Language: {performance descriptors:17532}.  Executive functions: {performance descriptors:19666}.  Learning/memory: {performance descriptors:90951}.  Fine motor {Motor Speed/Dexterity:65265}: {Motor Performance:28302}.  Psychological symptoms:     intact cognitive abilities in all domains tested (i.e., simple auditory attention, working memory, processing speed, visuospatial abilities, language, learning and memory, executive functioning), with several above-average performances, including relative strengths in learning/memory and aspects of executive functioning, and no scores falling below normal/expected ranges. There were also instances of better or comparable performance on more difficult tests of the same cognitive domain, suggesting the ability to marshal additional resources under more challenging circumstances, most often seen in individuals in whom non-neurological (e.g., psychological, behavioral, motivational, situational) factors may be interfering with optimal cognitive effectiveness/task engagement.     DIAGNOSTIC IMPRESSIONS:    RECOMMENDATIONS & CONSIDERATIONS:    1.         Thank you for the opportunity to participate in Ms. Quintero's evaluation and care. If I can be of further assistance, please do not hesitate to  "contact me at [phone].      -- EXTENDED REPORT --      History of Presenting Illness: Ms. Quintero is a 69 year-old, right-handed,  female, followed by Dr. Moreno (since ***) for neurological workup/monitoring/management of ***.    This is her second neuropsychological evaluation. I previously saw her on      RELEVANT LABS/EXAMS (with radiologist impressions of neuroimaging):      *** -   {Neuroimagin} {CT CONTRAST:32803}: ***    *** -   {Neuroimagin} {CT CONTRAST:15123}: ***    CLINICAL INTERVIEW: {Neuropsych Symptoms:25466}.    Ms. Quintero's {accompanied by:92650} described ***. There have not been any dramatic personality changes or concerns regarding vulnerability to deception.    {memory loss impairments:56133}  {memory loss signs and symptoms:18611}  {CoCM Memory concerns:13003}    Relevant Medical Status & History:   No reported balance/gait disturbance, recent unprovoked falls, or consistent involuntary/uncontrolled movements (though she noticed her hands can be shaky in the morning/during the day at times, and her handwriting has become messier).  No recent urinary changes. Hearing and vision are reportedly adequate. She is not prone to headaches, migraines, dizziness, or syncopal events. She denied significant issue with pain. She reported sustaining a head injury ~*** ago, with no LOC, PTA, or lingering symptoms. There is no known history of seizures, clinical cerebrovascular events, or other neurological disorder.     Balance/gait disturbance:  {Yes/No + Qualifier:91007::\"None reported.\"}  Recent unprovoked falls:  {Yes/No + Qualifier:21705::\"None reported.\"}  Involuntary motor movements (e.g., tremors):  {Yes/No + Qualifier:39506::\"None reported.\"}  Recent urinary changes:  {Yes/No + Qualifier:26949::\"None reported.\"} (i.e., increased frequency, urgency, episodes of urinary incontinence.     Hx concussion/head injury:  {Yes/No + Qualifier:62902::\"None reported.\"}  Hx " "seizure:  {Yes/No + Qualifier:10172::\"None reported.\"}  Hx stroke/TIA:  {Yes/No + Qualifier:38802::\"None reported.\"}    Patient Active Problem List   Diagnosis    Asthma    Bruxism    Carpal tunnel syndrome of left wrist    Chronic constipation    Chronic daily headache    Inverted T wave    Memory changes    Mixed hyperlipidemia    Orthostatic dizziness    Other melanin hyperpigmentation    Other seborrheic dermatitis    Other skin changes due to chronic exposure to nonionizing radiation    Overactive bladder    Paresthesias    Prediabetes    Hemangioma of skin and subcutaneous tissue    Psoriasis    Right hand weakness    Scoliosis of cervical spine    Seborrheic keratoses    Unsteady gait    Urge incontinence    Urinary incontinence    Vision changes    Class 1 obesity with alveolar hypoventilation and body mass index (BMI) of 31.0 to 31.9 in adult    Dyspnea on exertion    Excessive daytime sleepiness    Menopause    Thyroid nodule    Vertigo    Medicare annual wellness visit, subsequent    Screening for multiple conditions    Cardiac risk counseling    Stress and adjustment reaction    Visit for screening mammogram    Vitamin D deficiency    Esophageal dysphagia     Past Medical History:   Diagnosis Date    Body mass index (BMI) 32.0-32.9, adult 11/30/2020    Body mass index (BMI) of 32.0 to 32.9 in adult    Changes in skin texture 09/17/2019    Scab    Dermatitis, unspecified 01/22/2018    Facial dermatitis    Encounter for immunization 06/30/2020    Need for vaccination for zoster    Encounter for screening for malignant neoplasm of colon 01/16/2017    Colon cancer screening    Immunization not carried out because of patient refusal 08/23/2016    Influenza vaccine refused    Localized edema 08/23/2016    Lower leg edema    Localized swelling, mass and lump, right upper limb 01/28/2020    Localized swelling on right hand    Other benign neoplasm of skin of unspecified ear and external auricular canal " 01/22/2018    Ear canal papilloma    Other conditions influencing health status 04/06/2020    History of cough    Other specified congenital deformities of hip     Hip dysplasia    Pain in joints of unspecified hand 01/30/2020    Pain in multiple finger joints    Pain in left hip 08/27/2019    Left hip pain    Pain in right hip 03/17/2022    Lateral pain of right hip    Personal history of other (healed) physical injury and trauma     History of whiplash injury to neck    Personal history of other diseases of the musculoskeletal system and connective tissue 03/17/2022    History of low back pain    Personal history of other diseases of the nervous system and sense organs 03/17/2022    History of acute conjunctivitis    Personal history of other diseases of the respiratory system     History of asthma    Personal history of other diseases of the respiratory system 10/22/2018    History of acute bronchitis    Personal history of other diseases of the respiratory system 11/21/2016    History of bronchitis    Personal history of other drug therapy 01/28/2020    History of pneumococcal vaccination    Personal history of other endocrine, nutritional and metabolic disease     History of hyperlipidemia    Personal history of other infectious and parasitic diseases 02/20/2017    History of viral infection    Personal history of other malignant neoplasm of large intestine     History of malignant neoplasm of colon    Sprain of unspecified ligament of left ankle, initial encounter 04/28/2021    Sprain of ankle, left    Trigger thumb, unspecified thumb 01/03/2022    Trigger thumb    Unspecified abdominal pain 11/30/2020    Abdominal cramping    Unspecified abdominal pain     Abdominal pain, left lateral       Current Outpatient Medications:     albuterol 90 mcg/actuation inhaler, Inhale 2 puffs every 4 hours if needed for wheezing., Disp: 18 g, Rfl: 11    ascorbic acid (Vitamin C) 1,000 mg tablet, Take 1 tablet (1,000 mg) by  mouth once daily., Disp: , Rfl:     b complex vitamins (Super B-50 Complex) capsule, Take 1 capsule by mouth once daily., Disp: , Rfl:     biotin 10 mg tablet, Take 1 tablet (10 mg) by mouth once daily., Disp: , Rfl:     budesonide-formoteroL (Symbicort) 80-4.5 mcg/actuation inhaler, Inhale 2 puffs 2 times a day. Rinse mouth with water after use to reduce aftertaste and incidence of candidiasis. Do not swallow., Disp: 10.2 g, Rfl: 11    cholecalciferol (Vitamin D-3) 25 MCG (1000 UT) tablet, Take 1 tablet (25 mcg) by mouth once daily., Disp: , Rfl:     CINNAMON BARK ORAL, Take by mouth., Disp: , Rfl:     coenzyme Q-10 200 mg capsule, Take 1 capsule (200 mg) by mouth once daily., Disp: , Rfl:     cyanocobalamin (Vitamin B-12) 1,000 mcg tablet, Take 1 tablet (1,000 mcg) by mouth once daily., Disp: , Rfl:     ibuprofen (AdviL) 200 mg tablet, Take 1 tablet (200 mg) by mouth., Disp: , Rfl:     L. acidophilus/Bifid. animalis 32 billion cell capsule, Take 1 capsule by mouth once daily., Disp: , Rfl:     multivitamin with minerals (Centrum) tablet, Take 1 tablet by mouth once daily., Disp: , Rfl:     pravastatin (Pravachol) 20 mg tablet, Take 1 tablet (20 mg) by mouth once daily at bedtime., Disp: 90 tablet, Rfl: 3    resveratroL 100 mg capsule, Take 1 tablet by mouth once daily., Disp: , Rfl:     triamcinolone (Kenalog) 0.1 % cream, Apply to affected area twice daily as needed., Disp: 453 g, Rfl: 1    Psychiatric Status & History:     Developmental/Educational/Psychosocial History:      Relevant Family History:    Procedures/Tests:  Review of available records; Adult Neuropsychology History Form; Clinical interview with Ms. Quintero and her {accompanied by:01740} conjointly;  {Neuropsych Test List - Typical Batteries:35860}  {Neuropsych Test List - Frequently Used:18594}  {Neuropsych Test List - Less Commonly Used:06902}    The purpose of this evaluation was reviewed, as were procedural details, issues related to  "confidentiality, and options for receiving feedback regarding results. All questions were answered; Ms. Quintero verbalized understanding and agreed to proceed with this evaluation.  with Ms. Quintero and her {accompanied by:19235}    All testing was administered by a psychometrist; results were interpreted in the context of the appropriate normative data.   , unless otherwise noted (* indicates measures administered by the neuropsychologist)    Behavioral Observations/Neurobehavioral Status Exam: Ms. Quintero arrived for the appointment on time and {accompanied:64355}. She was casually dressed and appropriately groomed. Gait was steady/unremarkable / somewhat slow and unsteady. There were no overt gross motor abnormalities apparent / apparent involuntary motor movements. She was alert and fully / broadly oriented to self, situation, place, and time. She wore glasses; vision was reportedly adequate for testing. {Hearin}. Conversational speech was {speech fluency:66020::\"fluent\"} and {Speech:43078::\"normal in rate, prosody, and volume, with no apparent language abnormalities\"}. Ms. Quintero was {Historian:31349}. Organizing her thoughts appeared quite effortful. Expressed thoughts were {thought processes:33405}. She demonstrated {poor/limited/adequate/good:09916} insight into cognitive and functional difficulties. Affect was {Affect:42824}. Overall, she was cooperative in completing evaluation procedures and appeared to give her best effort on tasks.     Results/Data:       Descriptor T-Score Standard Score Z-Score Scaled Score %ile Rank   Very Superior > 70 > 130 > 2.00  > 16 > 98   Superior 63-69 120-129 1.34 to 1.99 14-15 91-97   High Average 57-62 110-119 0.67 to 1.33 12-13 75-90   Average 43-56  -0.66 to 0.66 8-11 25-74   Low Average 37-42 80-89 -1.33 to -0.67 6-7 9-24   Borderline 33-36 75-79 -1.67 to -1.34 5 5-8   Mildly Deficient 30-32 70-74 -2.00 to -1.68 4 2-4   Moderately Deficient 25-29 62-69  -2.53 " to -2.01 3 1   Severely Deficient <24 <61 <-2.54 1-2 <1            Time-based service: 95088 (*** minutes); 58621 (60 minutes); 33198 (*** minutes); 50294 (30 minutes); 39116 (*** minutes)   "completing household tasks, and driving (limited primarily to daytime; no recent accidents/near misses, serious traffic violations, navigation difficulty, or instances of getting lost).     Relevant Medical Status & History:    - Sleep: “Too light” / \"difficult to turn my mind off,\" with almost nightly use of melatonin and Sleepytime tea some nights. Typically in bed between 11PM and 1AM, with occasional difficulty falling asleep (up to 1-2 hours). Ms. Quintero wakes ~4x/night, without significant difficulty returning to sleep. She does not always feel well-rested in the morning (rises between 6AM and 10AM, depending on work schedule) and reported significant daytime fatigue, with tendency to doze. Sleep study has been recommended but not yet pursued.   - Appetite: Unchanged; weight has been relatively stable on a normal diet.    - Physical activity/exercise: Silver Sneakers 1-2x/week; during summer, Ms. Quintero hikes with her sister and friends.    - Alcohol use: Minimal (ranges from 1 drink/week to 1 drink/month); no reported history of particularly heavy or problematic alcohol use.   - Tobacco: Denied past and present use.   - Recreational drugs: Denied past and present use.   - Caffeine: 1 cup in the AM; 1 cup decaf in the PM.   - H2O: \"At least 2 bottles\"; \"trying to drink more.\"    Ms. Quintero reported diminished balance and occasional dizziness/lightheadedness (with most recent fall during summer 2024) and diminished physical strength (e.g., weights seem to be \"getting heavier\"; \"everything\" is harder). She indicated that longstanding involuntary motor movements (e.g., tremor) seem to have increased. She also reported occasional difficulty swallowing. There has been increased urinary urgency with occasional incontinence; she has not yet seen Urology.     History is notable for 3 head injuries (in 2000 and 2010), 2 of which occurred within one week; none were associated with loss of consciousness or prolonged " recovery. She denied any known history of seizure or stroke.     Patient Active Problem List   Diagnosis    Asthma    Bruxism    Carpal tunnel syndrome of left wrist    Chronic constipation    Chronic daily headache    Inverted T wave    Memory changes    Mixed hyperlipidemia    Orthostatic dizziness    Other melanin hyperpigmentation    Other seborrheic dermatitis    Other skin changes due to chronic exposure to nonionizing radiation    Overactive bladder    Paresthesias    Prediabetes    Hemangioma of skin and subcutaneous tissue    Psoriasis    Right hand weakness    Scoliosis of cervical spine    Seborrheic keratoses    Unsteady gait    Urge incontinence    Urinary incontinence    Vision changes    Class 1 obesity with alveolar hypoventilation and body mass index (BMI) of 31.0 to 31.9 in adult    Dyspnea on exertion    Excessive daytime sleepiness    Menopause    Thyroid nodule    Vertigo    Medicare annual wellness visit, subsequent    Screening for multiple conditions    Cardiac risk counseling    Stress and adjustment reaction    Visit for screening mammogram    Vitamin D deficiency    Esophageal dysphagia     Past Medical History:   Diagnosis Date    Body mass index (BMI) 32.0-32.9, adult 11/30/2020    Body mass index (BMI) of 32.0 to 32.9 in adult    Changes in skin texture 09/17/2019    Scab    Dermatitis, unspecified 01/22/2018    Facial dermatitis    Encounter for immunization 06/30/2020    Need for vaccination for zoster    Encounter for screening for malignant neoplasm of colon 01/16/2017    Colon cancer screening    Immunization not carried out because of patient refusal 08/23/2016    Influenza vaccine refused    Localized edema 08/23/2016    Lower leg edema    Localized swelling, mass and lump, right upper limb 01/28/2020    Localized swelling on right hand    Other benign neoplasm of skin of unspecified ear and external auricular canal 01/22/2018    Ear canal papilloma    Other conditions  influencing health status 04/06/2020    History of cough    Other specified congenital deformities of hip     Hip dysplasia    Pain in joints of unspecified hand 01/30/2020    Pain in multiple finger joints    Pain in left hip 08/27/2019    Left hip pain    Pain in right hip 03/17/2022    Lateral pain of right hip    Personal history of other (healed) physical injury and trauma     History of whiplash injury to neck    Personal history of other diseases of the musculoskeletal system and connective tissue 03/17/2022    History of low back pain    Personal history of other diseases of the nervous system and sense organs 03/17/2022    History of acute conjunctivitis    Personal history of other diseases of the respiratory system     History of asthma    Personal history of other diseases of the respiratory system 10/22/2018    History of acute bronchitis    Personal history of other diseases of the respiratory system 11/21/2016    History of bronchitis    Personal history of other drug therapy 01/28/2020    History of pneumococcal vaccination    Personal history of other endocrine, nutritional and metabolic disease     History of hyperlipidemia    Personal history of other infectious and parasitic diseases 02/20/2017    History of viral infection    Personal history of other malignant neoplasm of large intestine     History of malignant neoplasm of colon    Sprain of unspecified ligament of left ankle, initial encounter 04/28/2021    Sprain of ankle, left    Trigger thumb, unspecified thumb 01/03/2022    Trigger thumb    Unspecified abdominal pain 11/30/2020    Abdominal cramping    Unspecified abdominal pain     Abdominal pain, left lateral     Current Outpatient Medications:     albuterol 90 mcg/actuation inhaler, Inhale 2 puffs every 4 hours if needed for wheezing.    ascorbic acid (Vitamin C) 1,000 mg tablet, Take 1 tablet (1,000 mg) by mouth once daily.    b complex vitamins (Super B-50 Complex) capsule, Take 1  "capsule by mouth once daily.    biotin 10 mg tablet, Take 1 tablet (10 mg) by mouth once daily.    budesonide-formoteroL (Symbicort) 80-4.5 mcg/actuation inhaler, Inhale 2 puffs 2 times a day. Rinse mouth with water after use to reduce aftertaste and incidence of candidiasis. Do not swallow.    cholecalciferol (Vitamin D-3) 25 MCG (1000 UT) tablet, Take 1 tablet (25 mcg) by mouth once daily.    CINNAMON BARK ORAL, Take by mouth.    coenzyme Q-10 200 mg capsule, Take 1 capsule (200 mg) by mouth once daily.    cyanocobalamin (Vitamin B-12) 1,000 mcg tablet, Take 1 tablet (1,000 mcg) by mouth once daily.    ibuprofen (AdviL) 200 mg tablet, Take 1 tablet (200 mg) by mouth.    L. acidophilus/Bifid. animalis 32 billion cell capsule, Take 1 capsule by mouth once daily.    multivitamin with minerals (Centrum) tablet, Take 1 tablet by mouth once daily    pravastatin (Pravachol) 20 mg tablet, Take 1 tablet (20 mg) by mouth once daily at bedtime.    resveratroL 100 mg capsule, Take 1 tablet by mouth once daily    triamcinolone (Kenalog) 0.1 % cream, Apply to affected area twice daily as needed.    Psychiatric Status & History: Ms. Quintero described her mood as \"better when I'm not working.\" She acknowledged occasional low mood and loneliness (worse at night) and longstanding tendency to worry. Recent stressors include work, medical issues, and family \"drama.\" Typical daily activities include reading, working, and babysitting her granddaughter; she goes out to lunch with various social groups (~1x/month). She denied any history of suicidal/homicidal ideation, auditory/visual hallucinations, or mental health diagnosis/treatment.    Developmental/Educational/Psychosocial History:     - Early development: Reportedly normal; no known birth complications or developmental delays.   - Academic history: No history of attention difficulties, learning or behavioral problems, special services/academic accommodations, or repeated/skipped " grades. Grades were generally As/Bs.   - Educational attainment: Bachelor's degree in Interpersonal Communications from [employer] (around age [age]).   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 Quintero has been employed at [employer] for almost 10 years.   - Social history:  after 9 years of marriage, with 1 daughter (age 38/39) and 1 grandchild (age 4 months); another grandchild passed away from COVID-19 in .    Relevant Family History:   - Mother ( at age 85): Dementia, with symptom onset around age 70; Alzheimer's disease suspected but not formally diagnosed.   - Maternal grandmother ( in her 70s/80s): Alzheimer's disease (symptom timeline unknown).    - Father ( around age 76): Stroke.   - Alcoholism: Maternal relatives.    Procedures/Tests:  Review of available records; Adult Neuropsychology History Form; Clinical interview with Ms. Quintero;  Swift Anxiety Inventory (GUY)  Swift Depression Inventory (BDI-2)  [place] Naming Test-Second Edition (BNT-2)  Brief Visuospatial Memory Test-Revised (BVMT-R) - Form 1   [place] Verbal Learning Test-Second Edition (CVLT-II)  Maggy-Orozco Executive Function System (D-KEFS) Verbal Fluency  Dot Counting Test (DCT)  Executive Clock Drawing Task (CLOX)  Finger Tapping Test  Modified [place] Card Sorting Test (MWCST)  Stroop Color and Word Test (Stroop)  Trail Making Test (TMT)  Wechsler Adult Intelligence Scale-Fourth Edition (WAIS-IV) Digit Span and Coding subtests  Wechsler Memory Scale-Fourth Edition (WMS-IV) Logical Memory    The purpose of this evaluation was reviewed, as were procedural details, issues related to confidentiality, and options for receiving feedback regarding results. All questions were answered; Ms. Quintero verbalized understanding and agreed to proceed with this evaluation.    All testing was administered by a psychometrist; results were interpreted in the context of the appropriate  normative data.     Behavioral Observations/Neurobehavioral Status Exam: Ms. Quintero arrived for the appointment on time and unaccompanied. She was casually dressed and appropriately groomed. Gait was mildly unsteady, with a wide base. She was prone to fidgeting, and there were intermittent hyperkinetic/choreiform movements of her trunk/shoulders. She was alert and fully oriented to self, situation, place, and time. She did not bring her glasses to the appointment; vision (uncorrected) was reportedly adequate for testing. There was a degree of hearing difficulty, which required increased conversational volume and occasional repetition but did not appear to interfere significantly with formal testing. Receptive language appeared largely intact on informal observation. Conversational speech was fluent and rapid, with subtle disarticulation/slurring, occasional word-finding difficulty, and a few paraphasic errors (generally self-corrected). Ms. Quintero demonstrated adequate recall of autobiographical and clinical information/events, though there was some difficulty with timeline details. Expressed thoughts were quite tangential/circumstantial; responses to questions often included unnecessary/only loosely relevant details or veered off topic, and were occasionally off-target (i.e., did not address the question). Affect was full and generally dysphoric/anxious. Overall, she was cooperative in completing evaluation procedures and appeared to give her best effort on tasks.     Results/Data:       Descriptor T-Score Standard Score Z-Score Scaled Score %ile Rank   Very Superior > 70 > 130 > 2.00  > 16 > 98   Superior 63-69 120-129 1.34 to 1.99 14-15 91-97   High Average 57-62 110-119 0.67 to 1.33 12-13 75-90   Average 43-56  -0.66 to 0.66 8-11 25-74   Low Average 37-42 80-89 -1.33 to -0.67 6-7 9-24   Borderline 33-36 75-79 -1.67 to -1.34 5 5-8   Mildly Deficient 30-32 70-74 -2.00 to -1.68 4 2-4   Moderately Deficient  25-29 62-69  -2.53 to -2.01 3 1   Severely Deficient <24 <61 <-2.54 1-2 <1      Performance Validity: Results of stand-alone and embedded measures of performance validity were within valid ranges.     Attention & Working Memory: Span of auditory attention/working memory, measured by repetition of digits, was in the superior range overall, with high-average digits forward, superior digits backward, and high-average digits in sequence (i.e., numerical order).    Processing Speed & Executive Abilities: Efficiency in matching and reproducing symbols associated with numbers was average. Speeded word reading was average, and color naming was in the borderline range; color naming of incongruent color words (e.g., blue ink spelling the word “red”) was average, with overall performance pattern suggesting average selective attention.     Performance on a timed task that requires simultaneous visual scanning, number sequencing, and fine-motor coordination was average, with 0 errors. When the task became more complex, requiring alternating attention to sequencing numbers and letters, performance was average, with 0 errors. A comparison of scores on the two tasks did not suggest a problem with shifting of mental set.    On a concept-formation and problem-solving task that involves matching cards by shared fundamental attributes and using hypothesis testing (i.e., trial and error) to inform responses, overall performance was average. Ms. Quintero quickly identified successful response patterns and effectively utilized performance feedback to guide/adapt her approach, ultimately completing all 6 target solutions with limited errors.     Learning & Memory: Immediate recall of logically organized semantic information in the form of two stories was low average, with subtle perseveration/source-memory confusion (i.e., mixed up names; a name from the first story was misattributed to a character in the second). After a 20-minute delay,  recall was low average, without needing semantic reminder cues to help prompt recall. Delayed recognition of story detail was in the low-average range.     Learning and recall of a supraspan 16-item word list over five trials was low average, with a low-average learning slope (trial scores: 5, 6, 5, 7, 8) and average recall consistency. Use of semantic clustering strategy during list learning was low average; instead, Ms. Quintero tended to use less efficient serial (forward, bidirectional) clustering strategies. Immediate recall of a second 16-word distractor list was average. Subsequent free and cued recall of the original word list were in the borderline range (3 and 6 correct responses, respectively). After a 20-minute delay, free and cued recall were mildly deficient (2 and 5 correct, respectively). Across recall trials, there was an average number of repetitions and an average number of intrusion errors. Delayed recognition was average (16/16), with an excessive number of false-positive errors (15); ability to distinguish list words from non-list foils was low average.     Regarding visuospatial memory, Ms. Chamberss ability to learn and recall an array of six geometric designs over three trials was in the low-average range, with an average learning curve. Later recall was low average (86% of best learning trial), and recognition of the designs was without error.     Language Functioning: Confrontation naming was low average (52/60), with significant benefit from phonemic cues (7/8). Letter fluency (i.e., rapid generation of words beginning with a given letter over three one-minute trials) was average. Category fluency (i.e., rapid generation of words in a specific category) was in the very superior range. Category fluency while switching back and forth between two categories was average; Ms. Quintero appeared to lose mental set mid-task and began providing words from a semantically-related category (i.e., food vs.  fruits). Across verbal fluency tasks, there was an average number of set-loss errors and an elevated number of repetitions.     Visuospatial Functioning: Ability to copy an array of two-dimensional geometric designs was intact. Spontaneous generation and copy of a clock drawing were average. construction accuracy.       Motor Functioning: Fine motor speed, as measured by finger tapping, was low average for the dominant (right) hand and average for the non-dominant (left).     Emotional Functioning: On face-valid self-report inventories assessing mood and emotional status, Ms. Quintero's responses suggested minimal depression and mild anxiety.        Time-based service: 72523 (49 minutes); 47805 (60 minutes); 78391 (164 minutes); 07400 (30 minutes); 12143 (155 minutes)

## 2025-04-01 ENCOUNTER — OFFICE VISIT (OUTPATIENT)
Facility: CLINIC | Age: 70
End: 2025-04-01
Payer: MEDICARE

## 2025-04-01 DIAGNOSIS — G31.84 MILD COGNITIVE IMPAIRMENT: ICD-10-CM

## 2025-04-01 DIAGNOSIS — F09 COGNITIVE AND NEUROBEHAVIORAL DYSFUNCTION: ICD-10-CM

## 2025-04-01 DIAGNOSIS — F43.23 ADJUSTMENT DISORDER WITH MIXED ANXIETY AND DEPRESSED MOOD: ICD-10-CM

## 2025-04-01 DIAGNOSIS — R41.3 MEMORY DIFFICULTY: Primary | ICD-10-CM

## 2025-04-01 PROCEDURE — 96138 PSYCL/NRPSYC TECH 1ST: CPT | Performed by: PSYCHOLOGIST

## 2025-04-01 PROCEDURE — 96139 PSYCL/NRPSYC TST TECH EA: CPT | Performed by: PSYCHOLOGIST

## 2025-04-01 PROCEDURE — 96133 NRPSYC TST EVAL PHYS/QHP EA: CPT | Performed by: PSYCHOLOGIST

## 2025-04-01 PROCEDURE — 96132 NRPSYC TST EVAL PHYS/QHP 1ST: CPT | Mod: AH | Performed by: PSYCHOLOGIST

## 2025-04-01 PROCEDURE — 96139 PSYCL/NRPSYC TST TECH EA: CPT | Mod: AH | Performed by: PSYCHOLOGIST

## 2025-04-01 PROCEDURE — 96138 PSYCL/NRPSYC TECH 1ST: CPT | Mod: AH | Performed by: PSYCHOLOGIST

## 2025-04-01 PROCEDURE — 96116 NUBHVL XM PHYS/QHP 1ST HR: CPT | Mod: AH | Performed by: PSYCHOLOGIST

## 2025-04-01 PROCEDURE — 96133 NRPSYC TST EVAL PHYS/QHP EA: CPT | Mod: AH | Performed by: PSYCHOLOGIST

## 2025-04-01 PROCEDURE — 96132 NRPSYC TST EVAL PHYS/QHP 1ST: CPT | Performed by: PSYCHOLOGIST

## 2025-04-01 PROCEDURE — 96116 NUBHVL XM PHYS/QHP 1ST HR: CPT | Performed by: PSYCHOLOGIST

## 2025-04-02 ENCOUNTER — APPOINTMENT (OUTPATIENT)
Dept: RESPIRATORY THERAPY | Facility: HOSPITAL | Age: 70
End: 2025-04-02
Payer: MEDICARE

## 2025-04-02 NOTE — PROGRESS NOTES
Neuropsychology Note    Name: Shania Quintero  : 1955  MRN: [id number]      Date of Service: 2025     Reason For Visit: Ms. Quintero underwent neuropsychological evaluation (her second) on 2025, for updated cognitive assessment due to ongoing memory difficulty and cognitive complaints. The full report can be found in the note for that visit.     She returned today for feedback regarding evaluation results.    Summary: Ms. Quintero was seen today to discuss findings from her neuropsychological evaluation on 2025; she was unaccompanied.    Results were reviewed. I explained that test findings demonstrated difficulty with certain executive abilities, including the executive aspects of learning/memory (e.g., organizational ability/approach, which is critical for optimizing information encoding and retrieval). Given that evaluation performances in  were largely within normal limits for all cognitive domains, present deficits/weaknesses reflect a relative decline in executive functioning over the past 4 years.    There was a thorough discussion of clinical impressions, including diagnosis of mild cognitive impairment. Etiology is unknown, though executive difficulty suggests disruption of frontal/frontal-subcortical circuitry, which can be associated with Ms. Quintero's vascular health/cerebrovascular risk factors. Described physical symptoms and possible involuntary motor movements raise concern for neuromuscular disorder. Ongoing neurological workup is recommended for optimal clarification of the underlying neurological process(es) and treatment planning.    Psychoeducation was provided regarding the role of executive abilities in day-to-day functioning, including learning/memory processes and behavior/emotion regulation; the importance of heart and vascular health in brain health; and the potential impact of stress, anxiety, depression, and fatigue/insufficient restorative sleep on cognitive  effectiveness. Recommendations were discussed, and questions were addressed.    Ms. Quintero was appropriately engaged in the appointment; affect was full, congruent with mood/context/thought content, and generally dysphoric, with occasional appropriate brightening. She verbalized understanding of findings, impressions, and recommendations.          Time-based service:    - Evaluation: 79144 (49 minutes); 72740 (60 minutes); 12418 (164 minutes); 69550 (30 minutes); 20552 (155 minutes)   - Feedback: 73437 (72 minutes)

## 2025-04-03 ENCOUNTER — APPOINTMENT (OUTPATIENT)
Dept: RESPIRATORY THERAPY | Facility: HOSPITAL | Age: 70
End: 2025-04-03
Payer: MEDICARE

## 2025-06-05 ENCOUNTER — APPOINTMENT (OUTPATIENT)
Dept: PRIMARY CARE | Facility: CLINIC | Age: 70
End: 2025-06-05
Payer: MEDICARE

## 2025-06-05 VITALS
HEART RATE: 76 BPM | BODY MASS INDEX: 31.01 KG/M2 | TEMPERATURE: 97.6 F | HEIGHT: 63 IN | SYSTOLIC BLOOD PRESSURE: 136 MMHG | OXYGEN SATURATION: 95 % | WEIGHT: 175 LBS | DIASTOLIC BLOOD PRESSURE: 85 MMHG

## 2025-06-05 DIAGNOSIS — E66.2 CLASS 1 OBESITY WITH ALVEOLAR HYPOVENTILATION, SERIOUS COMORBIDITY, AND BODY MASS INDEX (BMI) OF 31.0 TO 31.9 IN ADULT: ICD-10-CM

## 2025-06-05 DIAGNOSIS — R06.09 DYSPNEA ON EXERTION: ICD-10-CM

## 2025-06-05 DIAGNOSIS — R13.19 ESOPHAGEAL DYSPHAGIA: Primary | ICD-10-CM

## 2025-06-05 DIAGNOSIS — E66.811 CLASS 1 OBESITY WITH ALVEOLAR HYPOVENTILATION, SERIOUS COMORBIDITY, AND BODY MASS INDEX (BMI) OF 31.0 TO 31.9 IN ADULT: ICD-10-CM

## 2025-06-05 PROCEDURE — 1159F MED LIST DOCD IN RCRD: CPT | Performed by: INTERNAL MEDICINE

## 2025-06-05 PROCEDURE — 3008F BODY MASS INDEX DOCD: CPT | Performed by: INTERNAL MEDICINE

## 2025-06-05 PROCEDURE — 1036F TOBACCO NON-USER: CPT | Performed by: INTERNAL MEDICINE

## 2025-06-05 PROCEDURE — 99213 OFFICE O/P EST LOW 20 MIN: CPT | Performed by: INTERNAL MEDICINE

## 2025-06-05 PROCEDURE — 1160F RVW MEDS BY RX/DR IN RCRD: CPT | Performed by: INTERNAL MEDICINE

## 2025-06-05 PROCEDURE — 1124F ACP DISCUSS-NO DSCNMKR DOCD: CPT | Performed by: INTERNAL MEDICINE

## 2025-06-05 ASSESSMENT — ENCOUNTER SYMPTOMS
FEVER: 0
PALPITATIONS: 0
VOMITING: 0
NUMBNESS: 1
ROS GI COMMENTS: TROUBLE SWALLOWING
ABDOMINAL PAIN: 0
COUGH: 0
SHORTNESS OF BREATH: 1
LIGHT-HEADEDNESS: 1
CONSTIPATION: 0
DIZZINESS: 1
DIARRHEA: 0
CHILLS: 0
ARTHRALGIAS: 1
NAUSEA: 0

## 2025-06-05 ASSESSMENT — PATIENT HEALTH QUESTIONNAIRE - PHQ9
1. LITTLE INTEREST OR PLEASURE IN DOING THINGS: NOT AT ALL
SUM OF ALL RESPONSES TO PHQ9 QUESTIONS 1 AND 2: 0
2. FEELING DOWN, DEPRESSED OR HOPELESS: NOT AT ALL

## 2025-06-05 NOTE — PROGRESS NOTES
CHIEF COMPLAINT  dyspnea and Hyperlipidemia    HISTORY OF PRESENT ILLNESS  Shania Quintero is a 69 y.o. female presents today for follow up of dyspnea and Hyperlipidemia    HPI    Multiple complaints.  Continues to have dyspnea - she cancelled her appointments for pulmonologist and additional testing because she she did not like the location.  Due to follow-up with ENT for her balance problems and lightheadedness.   Continues to have dysphagia, food gets stuck in throat and she has to cough it up.  Has discomfort in armpits with activity.  Remote history of breast reduction.   Labs are in chart from August 2024, did not have drawn.       REVIEW OF SYSTEMS  Review of Systems   Constitutional:  Negative for chills and fever.   Respiratory:  Positive for shortness of breath. Negative for cough.    Cardiovascular:  Negative for chest pain, palpitations and leg swelling.   Gastrointestinal:  Negative for abdominal pain, constipation, diarrhea, nausea and vomiting.        Trouble swallowing   Musculoskeletal:  Positive for arthralgias.   Neurological:  Positive for dizziness, light-headedness and numbness.        Poor balance       ALLERGIES  Niacin    MEDICATIONS  Current Outpatient Medications   Medication Instructions    albuterol 90 mcg/actuation inhaler 2 puffs, inhalation, Every 4 hours PRN    ascorbic acid (Vitamin C) 1,000 mg tablet 1 tablet, Daily    b complex vitamins (Super B-50 Complex) capsule 1 capsule, Daily    biotin 10 mg tablet 1 tablet, Daily    cholecalciferol (Vitamin D-3) 25 MCG (1000 UT) tablet 1 tablet, Daily    CINNAMON BARK ORAL Take by mouth.    coenzyme Q-10 200 mg capsule 1 capsule, Daily    cyanocobalamin (Vitamin B-12) 1,000 mcg tablet 1 tablet, Daily    ibuprofen (ADVIL) 200 mg    L. acidophilus/Bifid. animalis 32 billion cell capsule 1 capsule, Daily    multivitamin with minerals (Centrum) tablet 1 tablet, Daily    pravastatin (PRAVACHOL) 20 mg, oral, Nightly    resveratroL 100 mg capsule 1  "tablet, Daily    triamcinolone (Kenalog) 0.1 % cream Apply to affected area twice daily as needed.       TOBACCO USE  Tobacco Use History[1]    DEPRESSION SCREEN  Over the past 2 weeks, how often have you been bothered by any of the following problems?  Little interest or pleasure in doing things: Not at all  Feeling down, depressed, or hopeless: Not at all    SURGICAL HISTORY  Past Surgical History:  2016: BREAST SURGERY      Comment:  Breast Surgery Reduction Procedure  2016:  SECTION, CLASSIC      Comment:   Section  2017: COLONOSCOPY      Comment:  Complete Colonoscopy  2020: OTHER SURGICAL HISTORY      Comment:  Endoscopy  2022: OTHER SURGICAL HISTORY      Comment:  Complete colonoscopy  2016: OTHER SURGICAL HISTORY      Comment:  Cervical Cerclage During Pregnancy       OBJECTIVE    /85   Pulse 76   Temp 36.4 °C (97.6 °F)   Ht 1.588 m (5' 2.5\")   Wt 79.4 kg (175 lb)   SpO2 95%   BMI 31.50 kg/m²    BMI: Estimated body mass index is 31.5 kg/m² as calculated from the following:    Height as of this encounter: 1.588 m (5' 2.5\").    Weight as of this encounter: 79.4 kg (175 lb).    BP Readings from Last 3 Encounters:   25 136/85   24 109/77   10/17/24 146/90      Wt Readings from Last 3 Encounters:   25 79.4 kg (175 lb)   24 80 kg (176 lb 4.8 oz)   10/17/24 80.9 kg (178 lb 6.4 oz)        PHYSICAL EXAM  Physical Exam  Constitutional:       Appearance: Normal appearance. She is obese.   HENT:      Head: Normocephalic and atraumatic.   Cardiovascular:      Rate and Rhythm: Normal rate and regular rhythm.      Heart sounds: No murmur heard.  Pulmonary:      Effort: Pulmonary effort is normal. No respiratory distress.      Breath sounds: Normal breath sounds. No wheezing.   Abdominal:      General: There is no distension.      Palpations: Abdomen is soft.      Tenderness: There is no abdominal tenderness.   Musculoskeletal:      " Right lower leg: No edema.      Left lower leg: No edema.   Neurological:      General: No focal deficit present.      Mental Status: She is alert and oriented to person, place, and time.          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  Try to cut back on processed foods, including foods made with flour, sugar, added salt, and saturated fat.           Dyspnea on exertion    Relevant Orders    Referral to Pulmonology    Esophageal dysphagia - Primary    Relevant Orders    Referral to Gastroenterology     Routine labs ordered 8/29/24, advised to get these fasting labs drawn as soon as she is able.     Dyspnea - chronic.  CXR 10/23/23, echocardiogram 2/19/24, PFT's 2/16/24.    - saw pulmonologist Oct 2024, additional testing ordered.  She cancelled because she was not comfortable with the location.  - referral to pulmonologist close to home, Dr. Wisdom at .     Memory loss, headaches - following with Dr. Moreno.  - has had neuropsych testing also with Dr. Spencer     Dysphagia - esophagram revealed slow peristalsis.  - follow-up with Dr. Valenzuela (GI), has seen him in the past.      Mammogram 1/12/21, 2/2/22, 11/15/24.      Follow-up in 3 months.                   [1]   Social History  Tobacco Use   Smoking Status Never    Passive exposure: Never   Smokeless Tobacco Never

## 2025-06-25 LAB
25(OH)D3+25(OH)D2 SERPL-MCNC: 54 NG/ML (ref 30–100)
ALBUMIN SERPL-MCNC: 4 G/DL (ref 3.6–5.1)
ALP SERPL-CCNC: 56 U/L (ref 37–153)
ALT SERPL-CCNC: 17 U/L (ref 6–29)
ANION GAP SERPL CALCULATED.4IONS-SCNC: 8 MMOL/L (CALC) (ref 7–17)
AST SERPL-CCNC: 16 U/L (ref 10–35)
BASOPHILS # BLD AUTO: 40 CELLS/UL (ref 0–200)
BASOPHILS NFR BLD AUTO: 0.6 %
BILIRUB SERPL-MCNC: 0.5 MG/DL (ref 0.2–1.2)
BUN SERPL-MCNC: 22 MG/DL (ref 7–25)
CALCIUM SERPL-MCNC: 9.6 MG/DL (ref 8.6–10.4)
CHLORIDE SERPL-SCNC: 106 MMOL/L (ref 98–110)
CHOLEST SERPL-MCNC: 264 MG/DL
CHOLEST/HDLC SERPL: 9.4 (CALC)
CO2 SERPL-SCNC: 27 MMOL/L (ref 20–32)
CREAT SERPL-MCNC: 0.71 MG/DL (ref 0.6–1)
EGFRCR SERPLBLD CKD-EPI 2021: 91 ML/MIN/1.73M2
EOSINOPHIL # BLD AUTO: 403 CELLS/UL (ref 15–500)
EOSINOPHIL NFR BLD AUTO: 6.1 %
ERYTHROCYTE [DISTWIDTH] IN BLOOD BY AUTOMATED COUNT: 12.8 % (ref 11–15)
EST. AVERAGE GLUCOSE BLD GHB EST-MCNC: 128 MG/DL
EST. AVERAGE GLUCOSE BLD GHB EST-SCNC: 7.1 MMOL/L
GLUCOSE SERPL-MCNC: 94 MG/DL (ref 65–99)
HBA1C MFR BLD: 6.1 %
HCT VFR BLD AUTO: 44.5 % (ref 35–45)
HDLC SERPL-MCNC: 28 MG/DL
HGB BLD-MCNC: 14.7 G/DL (ref 11.7–15.5)
LDLC SERPL CALC-MCNC: 177 MG/DL (CALC)
LYMPHOCYTES # BLD AUTO: 1914 CELLS/UL (ref 850–3900)
LYMPHOCYTES NFR BLD AUTO: 29 %
MCH RBC QN AUTO: 30.8 PG (ref 27–33)
MCHC RBC AUTO-ENTMCNC: 33 G/DL (ref 32–36)
MCV RBC AUTO: 93.1 FL (ref 80–100)
MONOCYTES # BLD AUTO: 462 CELLS/UL (ref 200–950)
MONOCYTES NFR BLD AUTO: 7 %
NEUTROPHILS # BLD AUTO: 3782 CELLS/UL (ref 1500–7800)
NEUTROPHILS NFR BLD AUTO: 57.3 %
NONHDLC SERPL-MCNC: 236 MG/DL (CALC)
PLATELET # BLD AUTO: 222 THOUSAND/UL (ref 140–400)
PMV BLD REES-ECKER: 9.6 FL (ref 7.5–12.5)
POTASSIUM SERPL-SCNC: 4 MMOL/L (ref 3.5–5.3)
PROT SERPL-MCNC: 6.7 G/DL (ref 6.1–8.1)
RBC # BLD AUTO: 4.78 MILLION/UL (ref 3.8–5.1)
SODIUM SERPL-SCNC: 141 MMOL/L (ref 135–146)
TRIGL SERPL-MCNC: 349 MG/DL
VIT B12 SERPL-MCNC: 668 PG/ML (ref 200–1100)
WBC # BLD AUTO: 6.6 THOUSAND/UL (ref 3.8–10.8)

## 2025-08-05 ENCOUNTER — HOSPITAL ENCOUNTER (OUTPATIENT)
Dept: RESPIRATORY THERAPY | Facility: HOSPITAL | Age: 70
Discharge: HOME | End: 2025-08-05
Payer: MEDICARE

## 2025-08-05 DIAGNOSIS — R06.09 DYSPNEA ON EXERTION: ICD-10-CM

## 2025-08-05 PROCEDURE — 94010 BREATHING CAPACITY TEST: CPT | Performed by: STUDENT IN AN ORGANIZED HEALTH CARE EDUCATION/TRAINING PROGRAM

## 2025-08-05 PROCEDURE — 94729 DIFFUSING CAPACITY: CPT | Performed by: STUDENT IN AN ORGANIZED HEALTH CARE EDUCATION/TRAINING PROGRAM

## 2025-08-05 PROCEDURE — 94799 UNLISTED PULMONARY SVC/PX: CPT | Performed by: STUDENT IN AN ORGANIZED HEALTH CARE EDUCATION/TRAINING PROGRAM

## 2025-08-05 PROCEDURE — 94726 PLETHYSMOGRAPHY LUNG VOLUMES: CPT | Performed by: STUDENT IN AN ORGANIZED HEALTH CARE EDUCATION/TRAINING PROGRAM

## 2025-08-05 PROCEDURE — 94726 PLETHYSMOGRAPHY LUNG VOLUMES: CPT

## 2025-08-06 LAB
MGC ASCENT PFT - FEV1 - PRE: 2.15
MGC ASCENT PFT - FEV1 - PREDICTED: 1.91
MGC ASCENT PFT - FVC - PRE: 2.95
MGC ASCENT PFT - FVC - PREDICTED: 2.43

## 2025-08-20 ENCOUNTER — OFFICE (OUTPATIENT)
Dept: URBAN - METROPOLITAN AREA CLINIC 26 | Facility: CLINIC | Age: 70
End: 2025-08-20
Payer: MEDICARE

## 2025-08-20 VITALS
HEART RATE: 61 BPM | HEIGHT: 61 IN | SYSTOLIC BLOOD PRESSURE: 161 MMHG | TEMPERATURE: 98 F | WEIGHT: 172 LBS | DIASTOLIC BLOOD PRESSURE: 88 MMHG

## 2025-08-20 DIAGNOSIS — R13.10 DYSPHAGIA, UNSPECIFIED: ICD-10-CM

## 2025-08-20 PROCEDURE — 99204 OFFICE O/P NEW MOD 45 MIN: CPT | Performed by: INTERNAL MEDICINE

## 2025-09-03 ENCOUNTER — HOSPITAL ENCOUNTER (OUTPATIENT)
Dept: RADIOLOGY | Facility: HOSPITAL | Age: 70
Discharge: HOME | End: 2025-09-03
Payer: MEDICARE

## 2025-09-03 DIAGNOSIS — R06.09 DYSPNEA ON EXERTION: ICD-10-CM

## 2025-09-03 PROCEDURE — 76000 FLUOROSCOPY <1 HR PHYS/QHP: CPT

## 2025-09-11 ENCOUNTER — APPOINTMENT (OUTPATIENT)
Dept: PRIMARY CARE | Facility: CLINIC | Age: 70
End: 2025-09-11
Payer: MEDICARE

## 2025-09-23 ENCOUNTER — APPOINTMENT (OUTPATIENT)
Dept: NEUROLOGY | Facility: CLINIC | Age: 70
End: 2025-09-23
Payer: MEDICARE

## 2025-10-24 ENCOUNTER — APPOINTMENT (OUTPATIENT)
Dept: GASTROENTEROLOGY | Facility: CLINIC | Age: 70
End: 2025-10-24
Payer: MEDICARE